# Patient Record
Sex: MALE | URBAN - METROPOLITAN AREA
[De-identification: names, ages, dates, MRNs, and addresses within clinical notes are randomized per-mention and may not be internally consistent; named-entity substitution may affect disease eponyms.]

---

## 2018-07-18 ENCOUNTER — PROCEDURE VISIT (OUTPATIENT)
Dept: PHYSICAL MEDICINE AND REHAB | Age: 43
End: 2018-07-18

## 2018-07-18 VITALS
HEART RATE: 79 BPM | WEIGHT: 202 LBS | DIASTOLIC BLOOD PRESSURE: 72 MMHG | SYSTOLIC BLOOD PRESSURE: 131 MMHG | BODY MASS INDEX: 29.92 KG/M2 | HEIGHT: 69 IN

## 2018-07-18 DIAGNOSIS — Z02.71 DISABILITY EXAMINATION: Primary | ICD-10-CM

## 2018-07-18 PROCEDURE — MISCADRIME: Performed by: PHYSICAL MEDICINE & REHABILITATION

## 2018-07-18 RX ORDER — BUPROPION HYDROCHLORIDE 150 MG/1
150 TABLET ORAL
COMMUNITY

## 2018-07-18 RX ORDER — TIZANIDINE 4 MG/1
4 TABLET ORAL
COMMUNITY

## 2018-07-18 RX ORDER — QUETIAPINE FUMARATE 100 MG/1
100 TABLET, FILM COATED ORAL
COMMUNITY

## 2018-07-18 RX ORDER — PREGABALIN 225 MG/1
225 CAPSULE ORAL
COMMUNITY

## 2018-07-18 RX ORDER — ALPRAZOLAM 0.5 MG/1
0.5 TABLET ORAL
COMMUNITY
Start: 2017-02-03

## 2018-07-18 NOTE — PATIENT INSTRUCTIONS
You were seen today for a one time consultation. A doctor-patient relationship was not established by this examination and  Dr. Chance Phillips does not serve as your primary or consulting doctor. The medical examination addressed body parts that relate to specific injuries or conditions alleged in your case. This examination does not constitute a comprehensive or complete medical examination. In addition, it does not substitute for medical care by your treating physician. Any report resulting from this medical examination will become part of your claim file. In addition, Dr. Chance Phillips may discuss information concerning the results of this examination with the requesting party and may also testify without limitation with regard to all findings of this examination/evaluation in any legal action, judicial or administrative proceedings relating to your claim. Any request for medical records related to this examination should be directed to the party who sent you to Dr. Chance Phillips as they cannot be released directly from our office. Thank you for allowing Dr. Chance Phillips to participate in your care.

## 2018-11-27 ENCOUNTER — PROCEDURE VISIT (OUTPATIENT)
Dept: PHYSICAL MEDICINE AND REHAB | Age: 43
End: 2018-11-27
Payer: COMMERCIAL

## 2018-11-27 DIAGNOSIS — Z02.71 DISABILITY EXAMINATION: Primary | ICD-10-CM

## 2018-11-27 PROCEDURE — MISCADRIME: Performed by: PHYSICAL MEDICINE & REHABILITATION

## 2023-04-20 LAB
N-DESMETHYLTAPENTADOL: >1000 NG/ML
TAPENTADOL: >1000 NG/ML

## 2023-04-24 LAB
6-ACETYLMORPHINE: <25 NG/ML
7-AMINOCLONAZEPAM: <25 NG/ML
ALPHA-HYDROXYALPRAZOLAM: <25 NG/ML
ALPHA-HYDROXYMIDAZOLAM: <25 NG/ML
ALPRAZOLAM: <25 NG/ML
AMPHETAMINE (PRESENCE) IN URINE BY SCREEN METHOD: ABNORMAL
BARBITURATES PRESENCE IN URINE BY SCREEN METHOD: ABNORMAL
CANNABINOIDS IN URINE BY SCREEN METHOD: ABNORMAL
CHLORDIAZEPOXIDE: <25 NG/ML
CLONAZEPAM: <25 NG/ML
COCAINE (PRESENCE) IN URINE BY SCREEN METHOD: ABNORMAL
CODEINE: <50 NG/ML
CREATINE, URINE FOR DRUG: 153 MG/DL
DIAZEPAM: <25 NG/ML
DRUG SCREEN COMMENT URINE: ABNORMAL
EDDP: <25 NG/ML
FENTANYL CONFIRMATION, URINE: <2.5 NG/ML
HYDROCODONE: <25 NG/ML
HYDROMORPHONE: <25 NG/ML
LORAZEPAM: <25 NG/ML
METHADONE CONFIRMATION,URINE: <25 NG/ML
MIDAZOLAM: <25 NG/ML
MORPHINE URINE: <50 NG/ML
NORDIAZEPAM: <25 NG/ML
NORFENTANYL: <2.5 NG/ML
NORHYDROCODONE: <25 NG/ML
NOROXYCODONE: <25 NG/ML
O-DESMETHYLTRAMADOL: <50 NG/ML
OXAZEPAM: <25 NG/ML
OXYCODONE: <25 NG/ML
OXYMORPHONE: <25 NG/ML
PHENCYCLIDINE (PRESENCE) IN URINE BY SCREEN METHOD: ABNORMAL
TEMAZEPAM: <25 NG/ML
TRAMADOL: <50 NG/ML
ZOLPIDEM METABOLITE (ZCA): <25 NG/ML
ZOLPIDEM: <25 NG/ML

## 2023-04-26 LAB
AMPHETAMINES,URINE: <50 NG/ML
MDA,URINE: <200 NG/ML
MDEA,URINE: <200 NG/ML
MDMA,UR: <200 NG/ML
METHAMPHETAMINE QUANTITATIVE URINE: <200 NG/ML
PHENTERMINE,UR: <200 NG/ML

## 2023-08-23 PROBLEM — R29.898 WEAKNESS OF BOTH LOWER EXTREMITIES: Status: ACTIVE | Noted: 2023-08-23

## 2023-08-23 PROBLEM — R26.81 UNSTEADY GAIT: Status: ACTIVE | Noted: 2023-08-23

## 2023-08-23 PROBLEM — R29.898 BILATERAL ARM WEAKNESS: Status: ACTIVE | Noted: 2023-08-23

## 2023-08-23 PROBLEM — G56.03 CARPAL TUNNEL SYNDROME ON BOTH SIDES: Status: ACTIVE | Noted: 2023-08-23

## 2023-08-23 PROBLEM — G43.019 INTRACTABLE MIGRAINE WITHOUT AURA AND WITHOUT STATUS MIGRAINOSUS: Status: ACTIVE | Noted: 2023-08-23

## 2023-08-23 PROBLEM — M79.18 CERVICAL MYOFASCIAL PAIN SYNDROME: Status: ACTIVE | Noted: 2023-08-23

## 2023-08-23 PROBLEM — Z79.891 LONG TERM (CURRENT) USE OF OPIATE ANALGESIC: Status: ACTIVE | Noted: 2023-08-23

## 2023-08-23 PROBLEM — M75.81 RIGHT ROTATOR CUFF TENDINITIS: Status: ACTIVE | Noted: 2023-08-23

## 2023-08-23 PROBLEM — M79.2 NEUROGENIC PAIN: Status: ACTIVE | Noted: 2023-08-23

## 2023-08-23 PROBLEM — M54.12 CERVICAL RADICULOPATHY: Status: ACTIVE | Noted: 2023-08-23

## 2023-08-23 PROBLEM — N52.8 OTHER MALE ERECTILE DYSFUNCTION: Status: ACTIVE | Noted: 2023-08-23

## 2023-08-23 PROBLEM — M96.1 CERVICAL POST-LAMINECTOMY SYNDROME: Status: ACTIVE | Noted: 2023-08-23

## 2023-08-23 RX ORDER — BUPROPION HYDROCHLORIDE 300 MG/1
1 TABLET ORAL DAILY
COMMUNITY
Start: 2018-08-08

## 2023-08-23 RX ORDER — QUETIAPINE FUMARATE 100 MG/1
1 TABLET, FILM COATED ORAL DAILY
COMMUNITY
Start: 2018-01-17

## 2023-08-23 RX ORDER — FLUOXETINE HYDROCHLORIDE 60 MG/1
1 TABLET, FILM COATED ORAL; ORAL DAILY
COMMUNITY

## 2023-08-23 RX ORDER — PREGABALIN 225 MG/1
1 CAPSULE ORAL 3 TIMES DAILY
COMMUNITY
Start: 2018-08-21 | End: 2023-11-28 | Stop reason: ALTCHOICE

## 2023-08-23 RX ORDER — HYDROXYZINE HYDROCHLORIDE 10 MG/1
1 TABLET, FILM COATED ORAL 3 TIMES DAILY
COMMUNITY

## 2023-08-23 RX ORDER — TOPIRAMATE 25 MG/1
2 TABLET ORAL 2 TIMES DAILY
COMMUNITY
Start: 2019-05-09

## 2023-08-23 RX ORDER — NALOXONE HYDROCHLORIDE 4 MG/.1ML
SPRAY NASAL
COMMUNITY
Start: 2020-02-04

## 2023-08-23 RX ORDER — BUPROPION HYDROCHLORIDE 150 MG/1
150 TABLET ORAL
COMMUNITY

## 2023-08-23 RX ORDER — ALPRAZOLAM 0.5 MG/1
TABLET ORAL
COMMUNITY
Start: 2018-09-28 | End: 2023-10-08 | Stop reason: ALTCHOICE

## 2023-10-03 ENCOUNTER — LAB (OUTPATIENT)
Dept: LAB | Facility: LAB | Age: 48
End: 2023-10-03
Payer: COMMERCIAL

## 2023-10-03 ENCOUNTER — OFFICE VISIT (OUTPATIENT)
Dept: PAIN MEDICINE | Facility: CLINIC | Age: 48
End: 2023-10-03
Payer: COMMERCIAL

## 2023-10-03 DIAGNOSIS — Z79.891 LONG TERM (CURRENT) USE OF OPIATE ANALGESIC: ICD-10-CM

## 2023-10-03 DIAGNOSIS — Z79.891 ENCOUNTER FOR LONG-TERM OPIATE ANALGESIC USE: ICD-10-CM

## 2023-10-03 DIAGNOSIS — M79.18 CERVICAL MYOFASCIAL PAIN SYNDROME: ICD-10-CM

## 2023-10-03 DIAGNOSIS — M54.12 CERVICAL RADICULOPATHY: ICD-10-CM

## 2023-10-03 DIAGNOSIS — Z79.891 ENCOUNTER FOR LONG-TERM OPIATE ANALGESIC USE: Primary | ICD-10-CM

## 2023-10-03 DIAGNOSIS — M96.1 CERVICAL POST-LAMINECTOMY SYNDROME: ICD-10-CM

## 2023-10-03 LAB
AMPHETAMINES UR QL SCN: ABNORMAL
BARBITURATES UR QL SCN: ABNORMAL
BZE UR QL SCN: ABNORMAL
CANNABINOIDS UR QL SCN: ABNORMAL
CREAT UR-MCNC: 118.7 MG/DL (ref 20–370)
PCP UR QL SCN: ABNORMAL

## 2023-10-03 PROCEDURE — 99214 OFFICE O/P EST MOD 30 MIN: CPT | Performed by: PHYSICAL MEDICINE & REHABILITATION

## 2023-10-05 ENCOUNTER — PHARMACY VISIT (OUTPATIENT)
Dept: PHARMACY | Facility: CLINIC | Age: 48
End: 2023-10-05
Payer: COMMERCIAL

## 2023-10-05 PROCEDURE — RXMED WILLOW AMBULATORY MEDICATION CHARGE

## 2023-10-08 NOTE — PROGRESS NOTES
Neck pain and right shoulder pain      History of Present Illness  Mr. Abbott returned today for follow up office visit      The pain in the neck is burning and it is associated with tightness over the neck and upper chest area. this is deep and achy and burning with tingling pain.     the pain is in the neck and sternum and in between over the rib cage as the pain is in the upper rib cage area.   the pain starts at the neck and comes around the rib cage to meet at the sternum     of note that he is seeing psychiatry now and he is on xanax 0.5 mg orally three times a day   he realizes the interaction between the therapeutic classes including the respiratory depression and potential death      opioids treatment agreement renewed in February 2023   Oarrs pulled and scanned in the chart. No concerns his OD score is 470 Narx 540 and Sedative 761he is xanax and pregabalin. he is cutting back the xanax by psychiatry      last urine toxicology testing in June 2022 and in April 2023 that was compatible.  This was repeated today as well I will recheck on the results  Xray updated lumbar spine MRI  ORT Score is 1 for age  Pain pathology and pain generators cervical spine and post cervical fusion syndrome  Modalities tried surgeries physical therapy     He remains limited with his activities of daily living function, ambulation and enjoyment of life The pain is interfering with activities of daily living, quality of life and quality of sleep. It is limiting the functions and everything takes longer to complete because of the slowing related to the pain. Movements are cautious to avoid aggravation of the symptoms.      Review of Systems  Denied any fever or chills. No weight loss and no night sweats. No cough or sputum production. No diarrhea   The constipation has been responding to fibers and over the counter medications.      No bladder and bowel incontinence and no other changes in bladder and bowel. No skin changes.  Reports tiredness and fatigability only if the pain is not controlled.   Denied opioids diversion and abuse and denies alcoholism. Denies overuse of the pain medications.     Physical examination  declined Chaperone for the visit and was adequately  draped for the exam.   Awake, alert, oriented for time, place and persons.  The speech is coherent well articulated and understood.   breathing with normal rate and rhythm      positive cervical root tension sign decreased sensory over the radial aspect of the right upper limb   some weakness in the right elbow flexion  no long tracts signs in the lower limbs no increased reflexes. plantar cutaneous reflex are downgoing       no hyperreflexia and plantar cutaneous reflex are downgoing      no pain behavior negative constance testing test    Impression plan  Problem List Items Addressed This Visit       Cervical post-laminectomy syndrome    Relevant Medications    tapentadol (Nucynta) 75 mg tablet    tapentadol (Nucynta) 75 mg tablet (Start on 11/2/2023)    Cervical myofascial pain syndrome    Relevant Medications    tapentadol (Nucynta) 75 mg tablet    tapentadol (Nucynta) 75 mg tablet (Start on 11/2/2023)    Cervical radiculopathy    Relevant Medications    tapentadol (Nucynta) 75 mg tablet    tapentadol (Nucynta) 75 mg tablet (Start on 11/2/2023)    Long term (current) use of opiate analgesic     The pain medications are controlling the symptoms and allowing the improvement of the activities of daily living and quality of life and quality of sleep   Other modalities were tried and failed. We are using the pain medications as a 4th line of treatment after Physical Therapy surgery injection and non opioids pain medications .   Compliance with the treatment plan is monitored closely using random urine drug testing 2-3 times a year and checking on the prescription monitoring drug program on a regular basis at every office visit before any prescription.  We also do random  pill counts upon as indicated upon any suspicion regarding overuse or misuse of pain medications.  The pain control is enabling performance of the home exercises program and that is also helping with the pain control            Other Visit Diagnoses       Encounter for long-term opiate analgesic use    -  Primary    Relevant Medications    tapentadol (Nucynta) 75 mg tablet    tapentadol (Nucynta) 75 mg tablet (Start on 11/2/2023)    Other Relevant Orders    Opiate/Opioid/Benzo Prescription Compliance (Completed)            He does not want to consider spinal cord stimulator or any other interventions at this time.    Based on the above findings and the clinical response to the opioids medications and improvement of the activities of daily living, sleep, and work performance. We made this complex decision to continue the opioids therapy in light of the evidence of the patient's responsibility in using the pain medications as prescribed for the nonmalignant chronic pain condition. We discussed about the use of the pain medications to treat the symptoms of chronic nonmalignant pain and we are not trying the repair the permanent damage in the tissues, rather we are trying to control the symptoms induced by the permanent damage to the tissues inducing the chronic pain condition and resulting disability. I explained the difference and discussed it with the patient and stressed the importance of knowing the difference especially because of the potential side effects and the potential addicting effect and habit forming nature of the dangerous drugs we are using to treat the symptoms of the chronic pain.     We discussed that we are prescribing the medications on good stuart and legitimate medical reason     The level of clinical decision making in this office visit,  is high, given the high risks of complications with the morbidity and mortality due to the fact that acute and chronic pain may pose a threat to life and bodily  function, if under treated, poorly treated, or with failure to maintain adequate treatment and timely medical follow up. Additionally over treatment has its own set of complications including overdosing on the pain medications and also the habit forming potentials with the use of the medications used to treat chronic painful conditions including therapeutic classes classified as dangerous medications. Given the serious and fluctuating nature of pain level and instensity with extensive consideration for whenever pain changes, there is always the risk of prolonged functional impairment requiring close patient monitoring with regular assessments and reassessments and high level medical decision making at every office visit. The amount and complexity of data reviewed is high given the patient clinical presentation, labs,  data, radiology reports, and other tests as discussed during office visits. Pertinent data whether positive or negative were taken in consideration in the process of making this high level medical decision.

## 2023-10-30 DIAGNOSIS — M79.18 CERVICAL MYOFASCIAL PAIN SYNDROME: ICD-10-CM

## 2023-10-30 DIAGNOSIS — Z79.891 ENCOUNTER FOR LONG-TERM OPIATE ANALGESIC USE: ICD-10-CM

## 2023-10-30 DIAGNOSIS — M54.12 CERVICAL RADICULOPATHY: ICD-10-CM

## 2023-10-30 DIAGNOSIS — M96.1 CERVICAL POST-LAMINECTOMY SYNDROME: ICD-10-CM

## 2023-10-30 RX ORDER — PREGABALIN 225 MG/1
225 CAPSULE ORAL 3 TIMES DAILY
Qty: 270 CAPSULE | Refills: 0 | Status: CANCELLED | OUTPATIENT
Start: 2023-10-30 | End: 2024-05-01

## 2023-11-04 ENCOUNTER — PHARMACY VISIT (OUTPATIENT)
Dept: PHARMACY | Facility: CLINIC | Age: 48
End: 2023-11-04
Payer: COMMERCIAL

## 2023-11-04 PROCEDURE — RXMED WILLOW AMBULATORY MEDICATION CHARGE

## 2023-11-28 ENCOUNTER — OFFICE VISIT (OUTPATIENT)
Dept: PAIN MEDICINE | Facility: CLINIC | Age: 48
End: 2023-11-28
Payer: COMMERCIAL

## 2023-11-28 ENCOUNTER — PHARMACY VISIT (OUTPATIENT)
Dept: PHARMACY | Facility: CLINIC | Age: 48
End: 2023-11-28
Payer: COMMERCIAL

## 2023-11-28 DIAGNOSIS — Z79.891 LONG TERM (CURRENT) USE OF OPIATE ANALGESIC: ICD-10-CM

## 2023-11-28 DIAGNOSIS — M79.18 CERVICAL MYOFASCIAL PAIN SYNDROME: ICD-10-CM

## 2023-11-28 DIAGNOSIS — Z79.891 ENCOUNTER FOR LONG-TERM OPIATE ANALGESIC USE: ICD-10-CM

## 2023-11-28 DIAGNOSIS — Z79.891 LONG TERM CURRENT USE OF OPIATE ANALGESIC: ICD-10-CM

## 2023-11-28 DIAGNOSIS — M96.1 CERVICAL POST-LAMINECTOMY SYNDROME: Primary | ICD-10-CM

## 2023-11-28 DIAGNOSIS — M54.12 CERVICAL RADICULOPATHY: ICD-10-CM

## 2023-11-28 DIAGNOSIS — M75.81 RIGHT ROTATOR CUFF TENDINITIS: ICD-10-CM

## 2023-11-28 PROCEDURE — 99214 OFFICE O/P EST MOD 30 MIN: CPT | Performed by: PHYSICAL MEDICINE & REHABILITATION

## 2023-11-28 PROCEDURE — RXMED WILLOW AMBULATORY MEDICATION CHARGE

## 2023-11-28 RX ORDER — PREGABALIN 150 MG/1
150 CAPSULE ORAL 2 TIMES DAILY
Qty: 56 CAPSULE | Refills: 1 | Status: SHIPPED | OUTPATIENT
Start: 2023-11-28 | End: 2024-01-25 | Stop reason: SDUPTHER

## 2023-11-28 NOTE — PROGRESS NOTES
Chief complaint  Pain in the neck and right arm    History  Mr Abbott is back for visit. He cotinue with pain in the back and right arm. The pain at the base of the neck is associated with deep stabbing sensation along with tight muscles limiting the range of motion of the neck mainly in flexion and sidebending.  This is associated with burning sensation going down the inner aspect of the right upper limb reaching the elbow, around the medial epicondyle, and ulnar aspect of the right forearm to the  wrist and the ring and little fingers. The pain worsens with reaching overhead and with bending the neck forward and to the side.  Rotation of the neck is limited by the tight muscles at the base of the neck and also by increased pain to the right upper limb.  This is associated with weakness with the right wrist flexion and decreased manual dexterity. The  has been weaker since the appearance of this pain.  Dropping objects from the hand if not careful handling objects.  No reported difficulty with the gait and no trouble with bowel or bladder continence.          Pain level without medication is 7/10 , with the medication pain level 1/10.     The pain meds are helping control the pain and improving Activities of Daily living and quality of life and quality of sleep.    opioids treatment agreement 2023  Oarrs pulled and scanned in the chart  no concerns  last urine toxicology testing earlier this year and it was compliant we will repeat  Xray updated MRI Cspine   ORT Score is  0  Pain pathology and pain generators cspine   Modalities tried injection, surgery, physical therapy, TENS unit, nonsteroidal anti-inflammatory medication  cervical fusion     Denied any fever or chills. No weight loss and no night sweats. No cough or sputum production. No diarrhea   The constipation has been responding to fibers and over the counter medications.     No bladder and bowel incontinence and no other changes in bladder and  bowel. No skin changes.  Reports tiredness and fatigability only if the pain is not controlled.   Denied opioids diversion and abuse and denies alcoholism. Denies overuse of the pain medications.    The control of the pain with the pain medications is helping the control of the symptoms and allowing the function and activities of daily living, enjoyment of life, improving the quality of life and sleep with less interruption by the pain. The goal is symptomatic control of the nonmalignant chronic pain and not to repair the permanent damage in the tissues inducing the chronic pain conditions. We are aiming to shift the focus from the nonmalignant chronic pain to other aspects of life by symptomatically treating this chronic pain. If this pain is not treated it will lead to major morbidity and it is also associated with increased risks of mortality. The patient understands those very clearly and also understand high risks of morbidity and mortality if not strictly adherent to the treatment recommendations and reporting any associated side effects. Also patient understand the full responsibility associated with these medications to avoid abuse or overuse or any use of these medications for anything besides treating the patient's own chronic pain and nothing else under any circumstances.        Physical examination  Awake, alert and oriented for time place and persons   declined Chaperone for the visit and was adequately  draped for the exam.      Healed cervical incision Examination of the cervical spine showed tight muscle bands on the right side limiting the range of motion of the cervical spine in flexion and bending to the right side because that increases the pain.  Spurling's maneuver increased the pain at the base of the neck and down the right upper limb on the posterior aspect of the right arm reaching the elbow, forearm, the wrist to themiddle finger. Similar findings with cervical root tension sign on the  right side with the pain reaching down to the middle finger.   Decreased sensory to light touch on the posterior aspect of the right forearm to the middle finger.  Deep tendon reflexes showed normal biceps and brachioradialis reflex.  The  triceps reflexes is  decreased.  Elbow and wrist extension are 4/5 on the right compared to 5/5 on the left.  The right  is slightly weaker compared to the left .    Tinel's sign over the median nerve at the wrist and ulnar nerve at the elbow are both negative.    No increased tendon reflexes in the lower limbs plantar cutaneous are downgoing bilaterally.     The right shoulder physical examination showed mild atrophy of the right supraspinatus compared to the left side.  Impingement at 90 degrees in forward flexion and abduction.  This improved by few degrees upon external rotation.  Tight muscle bands and trigger points around the shoulder blade muscles.  No overt shoulder instability but tenderness on palpation of the subacromial area.  Negative cervical root tension sign.  Right shoulder strength is 4/5 in all directions.  No distal sensorimotor deficits associated with the right shoulder.       Reviewed MRI of cspine  from April 2018 showing no changes   Diagnosis  Problem List Items Addressed This Visit    None       Plan  Reviewed the pain generators.  Went over the types of pain with neuropathic and nociceptive and different pathologies and therapeutic modalities. Discussed the mechanism of action of interventions from acupuncture, physical therapy , regular exercises, injections, botox, spinal cord stimulation, and role of surgery     Went over pathology of the intervertebral disc displacement and the anatomical relation to the Nerve roots and relation to the radicular symptoms. Went over treatment modalities with conservative treatment including acupuncture   and epidural steroid injection with fluoroscopy guidance and last resort of surgery    Based on the above  findings and the clinical response to the opioids medications and improvement of the activities of daily living, sleep, and work performance. We made this complex decision to continue the opioids therapy in light of the evidence of the patient's responsibility in using the pain medications as prescribed for the nonmalignant chronic pain condition. We discussed about the use of the pain medications to treat the symptoms of chronic nonmalignant pain and we are not trying the repair the permanent damage in the tissues, rather we are trying to control the symptoms induced by the permanent damage to the tissues inducing the chronic pain condition and resulting disability. I explained the difference and discussed it with the patient and stressed the importance of knowing the difference especially because of the potential side effects and the potential addicting effect and habit forming nature of the dangerous drugs we are using to treat the symptoms of the chronic pain.      We discussed that we are prescribing the medications on good stuart and legitimate medical reason.     We reviewed the side effects and precautions of opioids prescriptions as discussed in the opioids treatment agreement.    realizes the interaction between the therapeutic classes including the respiratory depression and potential death     Random drug testing twice in 6 months we will submit     Nucynta 75  mg max of 4 a day and lyrica to help with burning pain    Discussed about NSAIDS and I explained about the opioids sparing effect to allow keeping the opioids dose at minimal effective dose.   I went over the potential side effects of the NSAIDS on the gastrointestinal, renal and cardiovascular systems.      I detailed the side effects from the acetaminophen in the medication and made aware of those. I also explained about the cumulative effects on the organs and mainly the liver.     Given the opioids therapy , we discussed about the risk for  accidental over dose on the pain medications, either for patient or other household. I went over the mechanism of action and mode of use of the Naloxone according to the  recommendations. I will provide a prescription for a kit.     Follow-up 8 weeks or earlier if needed     The level of clinical decision making in this office visit,  is high, given the high risks of complications with the morbidity and mortality due to the fact that acute and chronic pain may pose a threat to life and bodily function, if under treated, poorly treated, or with failure to maintain adequate treatment and timely medical follow up. Additionally over treatment has its own set of complications including overdosing on the pain medications and also the habit forming potentials with the use of the medications used to treat chronic painful conditions including therapeutic classes classified as dangerous medications. Given the serious and fluctuating nature of pain level and instensity with extensive consideration for whenever pain changes, there is always the risk of prolonged functional impairment requiring close patient monitoring with regular assessments and reassessments and high level medical decision making at every office visit. The amount and complexity of data reviewed is high given the patient clinical presentation, labs,  data, radiology reports, and other tests as discussed during office visits. Pertinent data whether positive or negative were taken in consideration in the process of making this high level medical decision.

## 2023-12-02 ENCOUNTER — PHARMACY VISIT (OUTPATIENT)
Dept: PHARMACY | Facility: CLINIC | Age: 48
End: 2023-12-02
Payer: COMMERCIAL

## 2023-12-02 PROCEDURE — RXMED WILLOW AMBULATORY MEDICATION CHARGE

## 2023-12-30 ENCOUNTER — PHARMACY VISIT (OUTPATIENT)
Dept: PHARMACY | Facility: CLINIC | Age: 48
End: 2023-12-30
Payer: COMMERCIAL

## 2023-12-30 PROCEDURE — RXMED WILLOW AMBULATORY MEDICATION CHARGE

## 2024-01-23 ENCOUNTER — APPOINTMENT (OUTPATIENT)
Dept: PAIN MEDICINE | Facility: CLINIC | Age: 49
End: 2024-01-23
Payer: COMMERCIAL

## 2024-01-25 ENCOUNTER — LAB (OUTPATIENT)
Dept: LAB | Facility: LAB | Age: 49
End: 2024-01-25
Payer: COMMERCIAL

## 2024-01-25 ENCOUNTER — OFFICE VISIT (OUTPATIENT)
Dept: PAIN MEDICINE | Facility: CLINIC | Age: 49
End: 2024-01-25
Payer: COMMERCIAL

## 2024-01-25 DIAGNOSIS — Z79.891 LONG TERM (CURRENT) USE OF OPIATE ANALGESIC: ICD-10-CM

## 2024-01-25 DIAGNOSIS — M79.18 CERVICAL MYOFASCIAL PAIN SYNDROME: ICD-10-CM

## 2024-01-25 DIAGNOSIS — M54.12 CERVICAL RADICULOPATHY: ICD-10-CM

## 2024-01-25 DIAGNOSIS — Z79.891 LONG TERM CURRENT USE OF OPIATE ANALGESIC: Primary | ICD-10-CM

## 2024-01-25 DIAGNOSIS — Z79.891 LONG TERM CURRENT USE OF OPIATE ANALGESIC: ICD-10-CM

## 2024-01-25 DIAGNOSIS — M75.81 RIGHT ROTATOR CUFF TENDINITIS: ICD-10-CM

## 2024-01-25 DIAGNOSIS — M96.1 CERVICAL POST-LAMINECTOMY SYNDROME: ICD-10-CM

## 2024-01-25 DIAGNOSIS — Z79.891 ENCOUNTER FOR LONG-TERM OPIATE ANALGESIC USE: ICD-10-CM

## 2024-01-25 LAB
AMPHETAMINES UR QL SCN: ABNORMAL
BARBITURATES UR QL SCN: ABNORMAL
BZE UR QL SCN: ABNORMAL
CANNABINOIDS UR QL SCN: ABNORMAL
CREAT UR-MCNC: 120.7 MG/DL (ref 20–370)
PCP UR QL SCN: ABNORMAL

## 2024-01-25 PROCEDURE — 82570 ASSAY OF URINE CREATININE: CPT

## 2024-01-25 PROCEDURE — 80307 DRUG TEST PRSMV CHEM ANLYZR: CPT

## 2024-01-25 PROCEDURE — 80365 DRUG SCREENING OXYCODONE: CPT

## 2024-01-25 PROCEDURE — 80346 BENZODIAZEPINES1-12: CPT

## 2024-01-25 PROCEDURE — 80361 OPIATES 1 OR MORE: CPT

## 2024-01-25 PROCEDURE — 80368 SEDATIVE HYPNOTICS: CPT

## 2024-01-25 PROCEDURE — 80373 DRUG SCREENING TRAMADOL: CPT

## 2024-01-25 PROCEDURE — 80354 DRUG SCREENING FENTANYL: CPT

## 2024-01-25 PROCEDURE — 80358 DRUG SCREENING METHADONE: CPT

## 2024-01-25 PROCEDURE — RXMED WILLOW AMBULATORY MEDICATION CHARGE

## 2024-01-25 PROCEDURE — 99214 OFFICE O/P EST MOD 30 MIN: CPT | Performed by: PHYSICAL MEDICINE & REHABILITATION

## 2024-01-25 PROCEDURE — 80324 DRUG SCREEN AMPHETAMINES 1/2: CPT

## 2024-01-25 RX ORDER — PREGABALIN 150 MG/1
150 CAPSULE ORAL 2 TIMES DAILY
Qty: 56 CAPSULE | Refills: 1 | Status: SHIPPED | OUTPATIENT
Start: 2024-01-25 | End: 2024-03-19 | Stop reason: SDUPTHER

## 2024-01-25 NOTE — PROGRESS NOTES
Chief complaint  Neck and RUL pain   R shoulder pain     History  Mr Abbott is back for visit  Continue to have the pain . Continue with pain in the neck and upper limbs wrose on the R  The pain at the base of the neck is associated with deep stabbing sensation along with tight muscles limiting the range of motion of the neck mainly in flexion and sidebending.  This is associated with burning sensation going down the back of the right upper limb reaching the elbow and the back of the right forearm to the  wrist and middle finger. The pain worsens with reaching overhead and with bending the neck forward and to the side.  Rotation of the neck is limited by the tight muscles at the base of the neck and also by increased pain to the right upper limb.  This is associated with weakness with the right  elbow extension and with wrist extenstion and affecting the  in that the  has been weaker with the appearance of this pain.  There is decreased manual dexterity of the right hand.  Occasionally dropping objects from the hand if not careful handling objects.  No reported difficulty with the gait and no trouble with bowel or bladder continence.     He has pain and tight muscles around shldr blade on R worse with movement  (that is from the Cspin not from the shldr pathology itself      Pain level without medication is 8/10 , with the medication pain level 4/10.     The pain meds are helping control the pain and improving Activities of Daily living and quality of life and quality of sleep.    opioids treatment agreement Jan 2024  PDI (Pain Disability Index) score: 34 the cold and damp weather are affecting him  Oarrs pulled and scanned in the chart  no concerns  last urine toxicology testing earlier this year and it was compliant we will repeat  Xray updated spine   ORT Score is  1 for depression   Pain pathology and pain generators spine   Modalities tried injection, surgery, physical therapy, TENS unit,  nonsteroidal anti-inflammatory medication  doesnot want SCS      Denied any fever or chills. No weight loss and no night sweats. No cough or sputum production. No diarrhea   The constipation has been responding to fibers and over the counter medications.     No bladder and bowel incontinence and no other changes in bladder and bowel. No skin changes.  Reports tiredness and fatigability only if the pain is not controlled.   Denied opioids diversion and abuse and denies alcoholism. Denies overuse of the pain medications.    The control of the pain with the pain medications is helping the control of the symptoms and allowing the function and activities of daily living, enjoyment of life, improving the quality of life and sleep with less interruption by the pain. The goal is symptomatic control of the nonmalignant chronic pain and not to repair the permanent damage in the tissues inducing the chronic pain conditions. We are aiming to shift the focus from the nonmalignant chronic pain to other aspects of life by symptomatically treating this chronic pain. If this pain is not treated it will lead to major morbidity and it is also associated with increased risks of mortality. The patient understands those very clearly and also understand high risks of morbidity and mortality if not strictly adherent to the treatment recommendations and reporting any associated side effects. Also patient understand the full responsibility associated with these medications to avoid abuse or overuse or any use of these medications for anything besides treating the patient's own chronic pain and nothing else under any circumstances.        Physical examination  Awake, alert and oriented for time place and persons   declined Chaperone for the visit and was adequately  draped for the exam.    Tirgger point over the R shoulder blade  Tight band around neck R>>L  CRT positive on R  No long tract  plantar cutaneous reflex are down going  bilaterally  No bowel and bladder incontinence     Diagnosis  Problem List Items Addressed This Visit       Cervical post-laminectomy syndrome    Relevant Medications    tapentadol (Nucynta) 75 mg tablet (Start on 1/27/2024)    tapentadol (Nucynta) 75 mg tablet (Start on 2/24/2024)    pregabalin (Lyrica) 150 mg capsule    Cervical myofascial pain syndrome    Relevant Medications    tapentadol (Nucynta) 75 mg tablet (Start on 1/27/2024)    tapentadol (Nucynta) 75 mg tablet (Start on 2/24/2024)    pregabalin (Lyrica) 150 mg capsule    Cervical radiculopathy    Relevant Medications    tapentadol (Nucynta) 75 mg tablet (Start on 1/27/2024)    tapentadol (Nucynta) 75 mg tablet (Start on 2/24/2024)    pregabalin (Lyrica) 150 mg capsule    Right rotator cuff tendinitis    Relevant Medications    tapentadol (Nucynta) 75 mg tablet (Start on 1/27/2024)    tapentadol (Nucynta) 75 mg tablet (Start on 2/24/2024)    pregabalin (Lyrica) 150 mg capsule    Long term (current) use of opiate analgesic    Relevant Medications    tapentadol (Nucynta) 75 mg tablet (Start on 1/27/2024)    tapentadol (Nucynta) 75 mg tablet (Start on 2/24/2024)    pregabalin (Lyrica) 150 mg capsule     Other Visit Diagnoses       Long term current use of opiate analgesic    -  Primary    Relevant Medications    tapentadol (Nucynta) 75 mg tablet (Start on 1/27/2024)    tapentadol (Nucynta) 75 mg tablet (Start on 2/24/2024)    pregabalin (Lyrica) 150 mg capsule    Other Relevant Orders    Opiate/Opioid/Benzo Prescription Compliance    Encounter for long-term opiate analgesic use        Relevant Medications    tapentadol (Nucynta) 75 mg tablet (Start on 1/27/2024)    tapentadol (Nucynta) 75 mg tablet (Start on 2/24/2024)    pregabalin (Lyrica) 150 mg capsule             Plan  Reviewed the pain generators.  Went over the types of pain with neuropathic and nociceptive and different pathologies and therapeutic modalities. Discussed the mechanism of action of  interventions from acupuncture, physical therapy , regular exercises, injections, botox, spinal cord stimulation, and role of surgery     Went over pathology of the intervertebral disc displacement and the anatomical relation to the Nerve roots and relation to the radicular symptoms. Went over treatment modalities with conservative treatment including acupuncture   and epidural steroid injection with fluoroscopy guidance and last resort of surgery    Based on the above findings and the clinical response to the opioids medications and improvement of the activities of daily living, sleep, and work performance. We made this complex decision to continue the opioids therapy in light of the evidence of the patient's responsibility in using the pain medications as prescribed for the nonmalignant chronic pain condition. We discussed about the use of the pain medications to treat the symptoms of chronic nonmalignant pain and we are not trying the repair the permanent damage in the tissues, rather we are trying to control the symptoms induced by the permanent damage to the tissues inducing the chronic pain condition and resulting disability. I explained the difference and discussed it with the patient and stressed the importance of knowing the difference especially because of the potential side effects and the potential addicting effect and habit forming nature of the dangerous drugs we are using to treat the symptoms of the chronic pain.      We discussed that we are prescribing the medications on good stuart and legitimate medical reason.     We reviewed the side effects and precautions of opioids prescriptions as discussed in the opioids treatment agreement.    realizes the interaction between the therapeutic classes including the respiratory depression and potential death     Random drug testing twice in 6 months we will submit     Nucynta 75 mg po qid and lyrica 150 mg bid  has a narcan at home know how and when to use it  if needed.   Does not want HIEN neither SCS    Discussed about NSAIDS and I explained about the opioids sparing effect to allow keeping the opioids dose at minimal effective dose.   I went over the potential side effects of the NSAIDS on the gastrointestinal, renal and cardiovascular systems.      I detailed the side effects from the acetaminophen in the medication and made aware of those. I also explained about the cumulative effects on the organs and mainly the liver.     Given the opioids therapy , we discussed about the risk for accidental over dose on the pain medications, either for patient or other household. I went over the mechanism of action and mode of use of the Naloxone according to the  recommendations. I will provide a prescription for a kit.     Follow-up 8 weeks or earlier if needed     The level of clinical decision making in this office visit,  is high, given the high risks of complications with the morbidity and mortality due to the fact that acute and chronic pain may pose a threat to life and bodily function, if under treated, poorly treated, or with failure to maintain adequate treatment and timely medical follow up. Additionally over treatment has its own set of complications including overdosing on the pain medications and also the habit forming potentials with the use of the medications used to treat chronic painful conditions including therapeutic classes classified as dangerous medications. Given the serious and fluctuating nature of pain level and instensity with extensive consideration for whenever pain changes, there is always the risk of prolonged functional impairment requiring close patient monitoring with regular assessments and reassessments and high level medical decision making at every office visit. The amount and complexity of data reviewed is high given the patient clinical presentation, labs,  data, radiology reports, and other tests as discussed during office  visits. Pertinent data whether positive or negative were taken in consideration in the process of making this high level medical decision.

## 2024-01-27 ENCOUNTER — PHARMACY VISIT (OUTPATIENT)
Dept: PHARMACY | Facility: CLINIC | Age: 49
End: 2024-01-27
Payer: COMMERCIAL

## 2024-01-27 PROCEDURE — RXMED WILLOW AMBULATORY MEDICATION CHARGE

## 2024-01-29 LAB
AMPHET UR-MCNC: <50 NG/ML
MDA UR-MCNC: <200 NG/ML
MDEA UR-MCNC: <200 NG/ML
MDMA UR-MCNC: <200 NG/ML
METHAMPHET UR-MCNC: <200 NG/ML
PHENTERMINE UR CFM-MCNC: <200 NG/ML

## 2024-02-24 ENCOUNTER — PHARMACY VISIT (OUTPATIENT)
Dept: PHARMACY | Facility: CLINIC | Age: 49
End: 2024-02-24
Payer: COMMERCIAL

## 2024-02-24 PROCEDURE — RXMED WILLOW AMBULATORY MEDICATION CHARGE

## 2024-03-14 ENCOUNTER — APPOINTMENT (OUTPATIENT)
Dept: PAIN MEDICINE | Facility: CLINIC | Age: 49
End: 2024-03-14
Payer: COMMERCIAL

## 2024-03-19 ENCOUNTER — OFFICE VISIT (OUTPATIENT)
Dept: PAIN MEDICINE | Facility: CLINIC | Age: 49
End: 2024-03-19
Payer: COMMERCIAL

## 2024-03-19 DIAGNOSIS — M54.12 CERVICAL RADICULOPATHY: ICD-10-CM

## 2024-03-19 DIAGNOSIS — M96.1 CERVICAL POST-LAMINECTOMY SYNDROME: Primary | ICD-10-CM

## 2024-03-19 DIAGNOSIS — Z79.891 LONG TERM CURRENT USE OF OPIATE ANALGESIC: ICD-10-CM

## 2024-03-19 DIAGNOSIS — M75.81 RIGHT ROTATOR CUFF TENDINITIS: ICD-10-CM

## 2024-03-19 DIAGNOSIS — Z79.891 LONG TERM (CURRENT) USE OF OPIATE ANALGESIC: ICD-10-CM

## 2024-03-19 DIAGNOSIS — M79.18 CERVICAL MYOFASCIAL PAIN SYNDROME: ICD-10-CM

## 2024-03-19 DIAGNOSIS — Z79.891 ENCOUNTER FOR LONG-TERM OPIATE ANALGESIC USE: ICD-10-CM

## 2024-03-19 PROCEDURE — 99214 OFFICE O/P EST MOD 30 MIN: CPT | Performed by: PHYSICAL MEDICINE & REHABILITATION

## 2024-03-19 RX ORDER — PREGABALIN 150 MG/1
150 CAPSULE ORAL 2 TIMES DAILY
Qty: 56 CAPSULE | Refills: 1 | Status: SHIPPED | OUTPATIENT
Start: 2024-03-22 | End: 2024-05-14 | Stop reason: SDUPTHER

## 2024-03-19 NOTE — PROGRESS NOTES
Chief complaint  Neck and shoulder pain     History  Jared Abbott is back for pain management office visit  Continue with neck and upper limb pain . That is related to neck and post surgery  The pain at the base of the neck is associated with deep stabbing sensation along with tight muscles limiting the range of motion of the neck mainly in flexion and sidebending.  This is associated with burning sensation going down the radial aspect of the right upper limb reaching above the elbow. No reported consistent symptoms beyond the elbow to the forearm, wrists or fingers. The pain worsens with reaching overhead or with bending with the neck forward and to the side.  Rotation of the neck is limited by the tight muscles at the base of the neck and also by increased pain to the right upper limb.  This is associated with weakness with the right shoulder and elbow flexion and external rotation.  No clear weakness in the  and the manual dexterity of the right hand is not affected.  No reported dropping objects from the hand.  No reported difficulty with the gait and no trouble with bowel or bladder continence.       Pain level without medication is 8/10 , with the medication pain level 3/10.     The pain meds are helping control the pain and improving Activities of Daily living and quality of life and quality of sleep.    opioids treatment agreement jan 2024  PDI (Pain Disability Index) score: 42  Oarrs pulled and scanned in the chart  no concerns  last urine toxicology testing earlier this year and it was compliant we will repeat  Xray updated spine neck   ORT Score is  0  Pain pathology and pain generators spine   Modalities tried injection, surgery, physical therapy, TENS unit, nonsteroidal anti-inflammatory medication  does not want SCS      Denied any fever or chills. No weight loss and no night sweats. No cough or sputum production. No diarrhea   The constipation has been responding to fibers and over the  counter medications.     No bladder and bowel incontinence and no other changes in bladder and bowel. No skin changes.  Reports tiredness and fatigability only if the pain is not controlled.   Denied opioids diversion and abuse and denies alcoholism. Denies overuse of the pain medications.    The control of the pain with the pain medications is helping the control of the symptoms and allowing the function and activities of daily living, enjoyment of life, improving the quality of life and sleep with less interruption by the pain. The goal is symptomatic control of the nonmalignant chronic pain and not to repair the permanent damage in the tissues inducing the chronic pain conditions. We are aiming to shift the focus from the nonmalignant chronic pain to other aspects of life by symptomatically treating this chronic pain. If this pain is not treated it will lead to major morbidity and it is also associated with increased risks of mortality. The patient understands those very clearly and also understand high risks of morbidity and mortality if not strictly adherent to the treatment recommendations and reporting any associated side effects. Also patient understand the full responsibility associated with these medications to avoid abuse or overuse or any use of these medications for anything besides treating the patient's own chronic pain and nothing else under any circumstances.        Physical examination  Awake, alert and oriented for time place and persons   declined Chaperone for the visit and was adequately  draped for the exam.      Examination of the cervical spine showed tight muscle bands on the right side limiting the range of motion of the cervical spine in flexion and bending to the right side because that increases the pain.  Spurling's maneuver increased the pain at the base of the neck and down the right upper limb on the radial aspect of the arm reaching above the elbow.  Similar finding with cervical  root tension sign on the right side.  No reported pain below the elbow.  Decreased sensory to light touch on the radial aspect of the right arm.  Right shoulder abduction and flexion is 4/5, elbow flexion on the right is slightly weaker compared to the left side.  Deep tendon reflexes showed decreased biceps reflex.  The brachioradialis and the triceps reflexes are present.  No sensorimotor deficits below the elbow.  Good  bilaterally.  Tinel's sign over the median nerve at the wrist and ulnar nerve at the elbow are both negative.    No increased tendon reflexes in the lower limbs plantar cutaneous are downgoing bilaterally.        Diagnosis  Problem List Items Addressed This Visit       Cervical post-laminectomy syndrome - Primary    Relevant Medications    pregabalin (Lyrica) 150 mg capsule (Start on 3/22/2024)    tapentadol (Nucynta) 75 mg tablet (Start on 3/22/2024)    tapentadol (Nucynta) 75 mg tablet (Start on 4/19/2024)    Cervical myofascial pain syndrome    Relevant Medications    pregabalin (Lyrica) 150 mg capsule (Start on 3/22/2024)    tapentadol (Nucynta) 75 mg tablet (Start on 3/22/2024)    tapentadol (Nucynta) 75 mg tablet (Start on 4/19/2024)    Cervical radiculopathy    Relevant Medications    pregabalin (Lyrica) 150 mg capsule (Start on 3/22/2024)    tapentadol (Nucynta) 75 mg tablet (Start on 3/22/2024)    tapentadol (Nucynta) 75 mg tablet (Start on 4/19/2024)    Right rotator cuff tendinitis    Relevant Medications    pregabalin (Lyrica) 150 mg capsule (Start on 3/22/2024)    tapentadol (Nucynta) 75 mg tablet (Start on 3/22/2024)    tapentadol (Nucynta) 75 mg tablet (Start on 4/19/2024)    Long term (current) use of opiate analgesic    Relevant Medications    pregabalin (Lyrica) 150 mg capsule (Start on 3/22/2024)    tapentadol (Nucynta) 75 mg tablet (Start on 3/22/2024)    tapentadol (Nucynta) 75 mg tablet (Start on 4/19/2024)     Other Visit Diagnoses       Long term current use of opiate  analgesic        Relevant Medications    pregabalin (Lyrica) 150 mg capsule (Start on 3/22/2024)    tapentadol (Nucynta) 75 mg tablet (Start on 3/22/2024)    tapentadol (Nucynta) 75 mg tablet (Start on 4/19/2024)    Encounter for long-term opiate analgesic use        Relevant Medications    pregabalin (Lyrica) 150 mg capsule (Start on 3/22/2024)    tapentadol (Nucynta) 75 mg tablet (Start on 3/22/2024)    tapentadol (Nucynta) 75 mg tablet (Start on 4/19/2024)             Plan  Reviewed the pain generators.  Went over the types of pain with neuropathic and nociceptive and different pathologies and therapeutic modalities. Discussed the mechanism of action of interventions from acupuncture, physical therapy , regular exercises, injections, botox, spinal cord stimulation, and role of surgery     Went over pathology of the intervertebral disc displacement and the anatomical relation to the Nerve roots and relation to the radicular symptoms. Went over treatment modalities with conservative treatment including acupuncture   and epidural steroid injection with fluoroscopy guidance and last resort of surgery    Based on the above findings and the clinical response to the opioids medications and improvement of the activities of daily living, sleep, and work performance. We made this complex decision to continue the opioids therapy in light of the evidence of the patient's responsibility in using the pain medications as prescribed for the nonmalignant chronic pain condition. We discussed about the use of the pain medications to treat the symptoms of chronic nonmalignant pain and we are not trying the repair the permanent damage in the tissues, rather we are trying to control the symptoms induced by the permanent damage to the tissues inducing the chronic pain condition and resulting disability. I explained the difference and discussed it with the patient and stressed the importance of knowing the difference especially because  of the potential side effects and the potential addicting effect and habit forming nature of the dangerous drugs we are using to treat the symptoms of the chronic pain.      We discussed that we are prescribing the medications on good stuart and legitimate medical reason.     We reviewed the side effects and precautions of opioids prescriptions as discussed in the opioids treatment agreement.    realizes the interaction between the therapeutic classes including the respiratory depression and potential death     Random drug testing twice in 6 months we will submit     Nucynta 75 mg qid   Lyrica 150 mg bid   has a narcan at home know how and when to use it if needed.  Discussed about considering cut back on pain medications     Discussed about NSAIDS and I explained about the opioids sparing effect to allow keeping the opioids dose at minimal effective dose.   I went over the potential side effects of the NSAIDS on the gastrointestinal, renal and cardiovascular systems.      I detailed the side effects from the acetaminophen in the medication and made aware of those. I also explained about the cumulative effects on the organs and mainly the liver.     Given the opioids therapy , we discussed about the risk for accidental over dose on the pain medications, either for patient or other household. I went over the mechanism of action and mode of use of the Naloxone according to the  recommendations. I will provide a prescription for a kit.     Follow-up 8 weeks or earlier if needed     The level of clinical decision making in this office visit,  is high, given the high risks of complications with the morbidity and mortality due to the fact that acute and chronic pain may pose a threat to life and bodily function, if under treated, poorly treated, or with failure to maintain adequate treatment and timely medical follow up. Additionally over treatment has its own set of complications including overdosing on the  pain medications and also the habit forming potentials with the use of the medications used to treat chronic painful conditions including therapeutic classes classified as dangerous medications. Given the serious and fluctuating nature of pain level and instensity with extensive consideration for whenever pain changes, there is always the risk of prolonged functional impairment requiring close patient monitoring with regular assessments and reassessments and high level medical decision making at every office visit. The amount and complexity of data reviewed is high given the patient clinical presentation, labs,  data, radiology reports, and other tests as discussed during office visits. Pertinent data whether positive or negative were taken in consideration in the process of making this high level medical decision.

## 2024-03-22 PROCEDURE — RXMED WILLOW AMBULATORY MEDICATION CHARGE

## 2024-03-23 ENCOUNTER — PHARMACY VISIT (OUTPATIENT)
Dept: PHARMACY | Facility: CLINIC | Age: 49
End: 2024-03-23
Payer: COMMERCIAL

## 2024-03-23 PROCEDURE — RXMED WILLOW AMBULATORY MEDICATION CHARGE

## 2024-03-29 ENCOUNTER — PHARMACY VISIT (OUTPATIENT)
Dept: PHARMACY | Facility: CLINIC | Age: 49
End: 2024-03-29
Payer: COMMERCIAL

## 2024-04-20 ENCOUNTER — PHARMACY VISIT (OUTPATIENT)
Dept: PHARMACY | Facility: CLINIC | Age: 49
End: 2024-04-20
Payer: COMMERCIAL

## 2024-04-20 PROCEDURE — RXMED WILLOW AMBULATORY MEDICATION CHARGE

## 2024-04-24 PROCEDURE — RXMED WILLOW AMBULATORY MEDICATION CHARGE

## 2024-04-25 ENCOUNTER — PHARMACY VISIT (OUTPATIENT)
Dept: PHARMACY | Facility: CLINIC | Age: 49
End: 2024-04-25
Payer: COMMERCIAL

## 2024-05-14 ENCOUNTER — OFFICE VISIT (OUTPATIENT)
Dept: PAIN MEDICINE | Facility: CLINIC | Age: 49
End: 2024-05-14
Payer: COMMERCIAL

## 2024-05-14 DIAGNOSIS — M79.18 CERVICAL MYOFASCIAL PAIN SYNDROME: ICD-10-CM

## 2024-05-14 DIAGNOSIS — M96.1 CERVICAL POST-LAMINECTOMY SYNDROME: ICD-10-CM

## 2024-05-14 DIAGNOSIS — M75.81 RIGHT ROTATOR CUFF TENDINITIS: ICD-10-CM

## 2024-05-14 DIAGNOSIS — Z79.891 ENCOUNTER FOR LONG-TERM OPIATE ANALGESIC USE: ICD-10-CM

## 2024-05-14 DIAGNOSIS — Z79.891 LONG TERM CURRENT USE OF OPIATE ANALGESIC: ICD-10-CM

## 2024-05-14 DIAGNOSIS — M54.12 CERVICAL RADICULOPATHY: Primary | ICD-10-CM

## 2024-05-14 DIAGNOSIS — M79.2 NEUROGENIC PAIN: ICD-10-CM

## 2024-05-14 DIAGNOSIS — Z79.891 LONG TERM (CURRENT) USE OF OPIATE ANALGESIC: ICD-10-CM

## 2024-05-14 PROCEDURE — 99214 OFFICE O/P EST MOD 30 MIN: CPT | Performed by: PHYSICAL MEDICINE & REHABILITATION

## 2024-05-14 RX ORDER — PREGABALIN 150 MG/1
150 CAPSULE ORAL 2 TIMES DAILY
Qty: 56 CAPSULE | Refills: 1 | Status: SHIPPED | OUTPATIENT
Start: 2024-05-14 | End: 2024-06-20

## 2024-05-14 NOTE — PROGRESS NOTES
Chief complaint  Neck and RUL pain     History  Jared Abbott is back for pain management office visit  Continue with pain in the back of neck and RUL  With right cervical radic  Long discussion about putting effort in cutting back on pain medications. Discussed about pain level changing and we do not know if the medications at this amount are still needed until we try and cut back slowly on pain medications. If the cut is tolerated then we continue. If cut not tolerated then, will go back on the pain medications level.  The goal from this is to keep the pain medications at the lowest effective dose.   I discussed about  about considering increasing the dose of the long acting and cut back the number of doses of the short acting medications. That will decrease the number of doses being dispensed daily.       Pain level without medication is 7/10 , with the medication pain level 0/10.     The pain meds are helping control the pain and improving Activities of Daily living and quality of life and quality of sleep.    opioids treatment agreement Jan 2024  Pill count today, using count tray, and in front of patient :  16    pills , last fill was on 4/20  for 112 tabs,  the count is correct  Oarrs pulled and scanned in the chart  no concerns  last urine toxicology testing earlier this year and it was compliant we will repeat  Xray updated spine   ORT Score is  0  Pain pathology and pain generators spine  Modalities tried injection, surgery, physical therapy, TENS unit, nonsteroidal anti-inflammatory medication      Denied any fever or chills. No weight loss and no night sweats. No cough or sputum production. No diarrhea   The constipation has been responding to fibers and over the counter medications.     No bladder and bowel incontinence and no other changes in bladder and bowel. No skin changes.  Reports tiredness and fatigability only if the pain is not controlled.   Denied opioids diversion and abuse and denies  alcoholism. Denies overuse of the pain medications.    The control of the pain with the pain medications is helping the control of the symptoms and allowing the function and activities of daily living, enjoyment of life, improving the quality of life and sleep with less interruption by the pain. The goal is symptomatic control of the nonmalignant chronic pain and not to repair the permanent damage in the tissues inducing the chronic pain conditions. We are aiming to shift the focus from the nonmalignant chronic pain to other aspects of life by symptomatically treating this chronic pain. If this pain is not treated it will lead to major morbidity and it is also associated with increased risks of mortality. The patient understands those very clearly and also understand high risks of morbidity and mortality if not strictly adherent to the treatment recommendations and reporting any associated side effects. Also patient understand the full responsibility associated with these medications to avoid abuse or overuse or any use of these medications for anything besides treating the patient's own chronic pain and nothing else under any circumstances.        Physical examination  Awake, alert and oriented for time place and persons   declined Chaperone for the visit and was adequately  draped for the exam.    Examination of the cervical spine showed tight muscle bands on the right side limiting the range of motion of the cervical spine in flexion and bending to the right side because that increases the pain.  Spurling's maneuver increased the pain at the base of the neck and down the right upper limb on the radial aspect of the arm reaching above the elbow, forearm and the first interdigital space including the thumb and index finger. Similar findings with cervical root tension sign on the right side with the pain reaching down to the thumb and index finger.   Decreased sensory to light touch on the radial aspect of the right  forearm with the first interdigital webspace as well as the thumb and index finger.  Deep tendon reflexes showed decreased biceps and brachioradialis reflex.  The  triceps reflexes is  present.  Elbow flexion and supination are 4/5 on the right compared to 5/5 on the left.  The right  is slightly weaker compared to the left .  Tinel's sign over the median nerve at the wrist and ulnar nerve at the elbow are both negative.    No increased tendon reflexes in the lower limbs plantar cutaneous are downgoing bilaterally.         Diagnosis  Problem List Items Addressed This Visit       Cervical post-laminectomy syndrome    Relevant Medications    tapentadol (Nucynta) 75 mg tablet (Start on 5/18/2024)    tapentadol (Nucynta) 75 mg tablet (Start on 6/15/2024)    pregabalin (Lyrica) 150 mg capsule    Cervical myofascial pain syndrome    Relevant Medications    tapentadol (Nucynta) 75 mg tablet (Start on 5/18/2024)    tapentadol (Nucynta) 75 mg tablet (Start on 6/15/2024)    pregabalin (Lyrica) 150 mg capsule    Cervical radiculopathy - Primary    Relevant Medications    tapentadol (Nucynta) 75 mg tablet (Start on 5/18/2024)    tapentadol (Nucynta) 75 mg tablet (Start on 6/15/2024)    pregabalin (Lyrica) 150 mg capsule    Neurogenic pain    Relevant Medications    tapentadol (Nucynta) 75 mg tablet (Start on 5/18/2024)    tapentadol (Nucynta) 75 mg tablet (Start on 6/15/2024)    pregabalin (Lyrica) 150 mg capsule    Right rotator cuff tendinitis    Relevant Medications    tapentadol (Nucynta) 75 mg tablet (Start on 5/18/2024)    tapentadol (Nucynta) 75 mg tablet (Start on 6/15/2024)    pregabalin (Lyrica) 150 mg capsule    Long term (current) use of opiate analgesic    Relevant Medications    tapentadol (Nucynta) 75 mg tablet (Start on 5/18/2024)    tapentadol (Nucynta) 75 mg tablet (Start on 6/15/2024)    pregabalin (Lyrica) 150 mg capsule     Other Visit Diagnoses       Long term current use of opiate analgesic         Relevant Medications    tapentadol (Nucynta) 75 mg tablet (Start on 5/18/2024)    tapentadol (Nucynta) 75 mg tablet (Start on 6/15/2024)    pregabalin (Lyrica) 150 mg capsule    Encounter for long-term opiate analgesic use        Relevant Medications    tapentadol (Nucynta) 75 mg tablet (Start on 5/18/2024)    tapentadol (Nucynta) 75 mg tablet (Start on 6/15/2024)    pregabalin (Lyrica) 150 mg capsule             Plan  Reviewed the pain generators.  Went over the types of pain with neuropathic and nociceptive and different pathologies and therapeutic modalities. Discussed the mechanism of action of interventions from acupuncture, physical therapy , regular exercises, injections, botox, spinal cord stimulation, and role of surgery     Went over pathology of the intervertebral disc displacement and the anatomical relation to the Nerve roots and relation to the radicular symptoms. Went over treatment modalities with conservative treatment including acupuncture   and epidural steroid injection with fluoroscopy guidance and last resort of surgery    Based on the above findings and the clinical response to the opioids medications and improvement of the activities of daily living, sleep, and work performance. We made this complex decision to continue the opioids therapy in light of the evidence of the patient's responsibility in using the pain medications as prescribed for the nonmalignant chronic pain condition. We discussed about the use of the pain medications to treat the symptoms of chronic nonmalignant pain and we are not trying the repair the permanent damage in the tissues, rather we are trying to control the symptoms induced by the permanent damage to the tissues inducing the chronic pain condition and resulting disability. I explained the difference and discussed it with the patient and stressed the importance of knowing the difference especially because of the potential side effects and the potential addicting  effect and habit forming nature of the dangerous drugs we are using to treat the symptoms of the chronic pain.      We discussed that we are prescribing the medications on good stuart and legitimate medical reason.     We reviewed the side effects and precautions of opioids prescriptions as discussed in the opioids treatment agreement.    realizes the interaction between the therapeutic classes including the respiratory depression and potential death     Random drug testing   we will submit     Long discussion about putting effort in cutting back on pain medications. Discussed about pain level changing and we do not know if the medications at this amount are still needed until we try and cut back slowly on pain medications. If the cut is tolerated then we continue. If cut not tolerated then, will go back on the pain medications level.  The goal from this is to keep the pain medications at the lowest effective dose.   Continue with nucynta and lyrica has a narcan at home know how and when to use it if needed.  Discussed about considering cut back on pain medications     Discussed about NSAIDS and I explained about the opioids sparing effect to allow keeping the opioids dose at minimal effective dose.   I went over the potential side effects of the NSAIDS on the gastrointestinal, renal and cardiovascular systems.      I detailed the side effects from the acetaminophen in the medication and made aware of those. I also explained about the cumulative effects on the organs and mainly the liver.     Given the opioids therapy , we discussed about the risk for accidental over dose on the pain medications, either for patient or other household. I went over the mechanism of action and mode of use of the Naloxone according to the  recommendations. I will provide a prescription for a kit.     Follow-up 8 weeks or earlier if needed     The level of clinical decision making in this office visit,  is high, given the high  risks of complications with the morbidity and mortality due to the fact that acute and chronic pain may pose a threat to life and bodily function, if under treated, poorly treated, or with failure to maintain adequate treatment and timely medical follow up. Additionally over treatment has its own set of complications including overdosing on the pain medications and also the habit forming potentials with the use of the medications used to treat chronic painful conditions including therapeutic classes classified as dangerous medications. Given the serious and fluctuating nature of pain level and instensity with extensive consideration for whenever pain changes, there is always the risk of prolonged functional impairment requiring close patient monitoring with regular assessments and reassessments and high level medical decision making at every office visit. The amount and complexity of data reviewed is high given the patient clinical presentation, labs,  data, radiology reports, and other tests as discussed during office visits. Pertinent data whether positive or negative were taken in consideration in the process of making this high level medical decision.

## 2024-05-18 ENCOUNTER — PHARMACY VISIT (OUTPATIENT)
Dept: PHARMACY | Facility: CLINIC | Age: 49
End: 2024-05-18
Payer: COMMERCIAL

## 2024-05-18 PROCEDURE — RXMED WILLOW AMBULATORY MEDICATION CHARGE

## 2024-05-22 PROCEDURE — RXMED WILLOW AMBULATORY MEDICATION CHARGE

## 2024-05-23 ENCOUNTER — PHARMACY VISIT (OUTPATIENT)
Dept: PHARMACY | Facility: CLINIC | Age: 49
End: 2024-05-23
Payer: COMMERCIAL

## 2024-06-15 ENCOUNTER — PHARMACY VISIT (OUTPATIENT)
Dept: PHARMACY | Facility: CLINIC | Age: 49
End: 2024-06-15
Payer: COMMERCIAL

## 2024-06-15 PROCEDURE — RXMED WILLOW AMBULATORY MEDICATION CHARGE

## 2024-06-19 ENCOUNTER — PHARMACY VISIT (OUTPATIENT)
Dept: PHARMACY | Facility: CLINIC | Age: 49
End: 2024-06-19
Payer: COMMERCIAL

## 2024-06-19 PROCEDURE — RXMED WILLOW AMBULATORY MEDICATION CHARGE

## 2024-07-09 ENCOUNTER — LAB (OUTPATIENT)
Dept: LAB | Facility: LAB | Age: 49
End: 2024-07-09
Payer: COMMERCIAL

## 2024-07-09 ENCOUNTER — APPOINTMENT (OUTPATIENT)
Dept: PAIN MEDICINE | Facility: CLINIC | Age: 49
End: 2024-07-09
Payer: COMMERCIAL

## 2024-07-09 DIAGNOSIS — M96.1 CERVICAL POST-LAMINECTOMY SYNDROME: ICD-10-CM

## 2024-07-09 DIAGNOSIS — Z79.891 LONG TERM CURRENT USE OF OPIATE ANALGESIC: ICD-10-CM

## 2024-07-09 DIAGNOSIS — M75.81 RIGHT ROTATOR CUFF TENDINITIS: Primary | ICD-10-CM

## 2024-07-09 DIAGNOSIS — M79.2 NEUROGENIC PAIN: ICD-10-CM

## 2024-07-09 DIAGNOSIS — Z79.891 LONG TERM (CURRENT) USE OF OPIATE ANALGESIC: ICD-10-CM

## 2024-07-09 DIAGNOSIS — M79.18 CERVICAL MYOFASCIAL PAIN SYNDROME: ICD-10-CM

## 2024-07-09 DIAGNOSIS — Z79.891 ENCOUNTER FOR LONG-TERM OPIATE ANALGESIC USE: ICD-10-CM

## 2024-07-09 DIAGNOSIS — M54.12 CERVICAL RADICULOPATHY: ICD-10-CM

## 2024-07-09 LAB
AMPHETAMINES UR QL SCN: NORMAL
BARBITURATES UR QL SCN: NORMAL
BZE UR QL SCN: NORMAL
CANNABINOIDS UR QL SCN: NORMAL
CREAT UR-MCNC: 70.4 MG/DL (ref 20–370)
PCP UR QL SCN: NORMAL

## 2024-07-09 PROCEDURE — 99214 OFFICE O/P EST MOD 30 MIN: CPT | Performed by: PHYSICAL MEDICINE & REHABILITATION

## 2024-07-09 PROCEDURE — 80354 DRUG SCREENING FENTANYL: CPT

## 2024-07-09 PROCEDURE — 80373 DRUG SCREENING TRAMADOL: CPT

## 2024-07-09 PROCEDURE — 80346 BENZODIAZEPINES1-12: CPT

## 2024-07-09 PROCEDURE — 82570 ASSAY OF URINE CREATININE: CPT

## 2024-07-09 PROCEDURE — 80361 OPIATES 1 OR MORE: CPT

## 2024-07-09 PROCEDURE — 80365 DRUG SCREENING OXYCODONE: CPT

## 2024-07-09 PROCEDURE — 80307 DRUG TEST PRSMV CHEM ANLYZR: CPT

## 2024-07-09 PROCEDURE — 80368 SEDATIVE HYPNOTICS: CPT

## 2024-07-09 PROCEDURE — 80358 DRUG SCREENING METHADONE: CPT

## 2024-07-09 RX ORDER — PREGABALIN 150 MG/1
150 CAPSULE ORAL 2 TIMES DAILY
Qty: 56 CAPSULE | Refills: 1 | Status: SHIPPED | OUTPATIENT
Start: 2024-07-16 | End: 2024-08-13

## 2024-07-09 NOTE — PROGRESS NOTES
Chief complaint  Neck and MARGUERITE pain     History  Jared Abbott is back for pain management office visit  Continues with pain in the RUL and R shoulder blade  The pain at the base of the neck is associated with deep stabbing sensation along with tight muscles limiting the range of motion of the neck mainly in flexion and sidebending.  This is associated with burning sensation going down the radial aspect of the right upper limb reaching above the elbow. No reported consistent symptoms beyond the elbow to the forearm, wrists or fingers. The pain worsens with reaching overhead or with bending with the neck forward and to the side.  Rotation of the neck is limited by the tight muscles at the base of the neck and also by increased pain to the right upper limb.  This is associated with weakness with the right shoulder and elbow flexion and external rotation.  No clear weakness in the  and the manual dexterity of the right hand is not affected.  No reported dropping objects from the hand.  No reported difficulty with the gait and no trouble with bowel or bladder continence.    Long discussion about putting effort in cutting back on pain medications. Discussed about pain level changing and we do not know if the medications at this amount are still needed until we try and cut back slowly on pain medications. If the cut is tolerated then we continue. If cut not tolerated then, will go back on the pain medications level.  The goal from this is to keep the pain medications at the lowest effective dose.        Pain level without medication is 8/10 , with the medication pain level 3/10.     The pain meds are helping control the pain and improving Activities of Daily living and quality of life and quality of sleep.    opioids treatment agreement Jan 2024     Oarrs pulled and reviewed, no concerns  last urine toxicology testing earlier this year and it was compliant we will repeat  Xray updated spine   ORT Score is   "0  Pain pathology and pain generators spine   Modalities tried injection, surgery, physical therapy, TENS unit, nonsteroidal anti-inflammatory medication       Review of Systems :  Denied any fever or chills. No weight loss and no night sweats. No cough or sputum production. No diarrhea   The constipation has been responding to fibers and over the counter medications.     No bladder and bowel incontinence and no other changes in bladder and bowel. No skin changes.  Reports tiredness and fatigability only if the pain is not controlled.   Denied opioids diversion and abuse and denies alcoholism. Denies overuse of the pain medications.  No reported euphoria sensation or getting a \"high\" on the pain medications.    The control of the pain with the pain medications is helping the control of the symptoms and allowing the function and activities of daily living, enjoyment of life, improving the quality of life and sleep with less interruption by the pain. The goal is symptomatic control of the nonmalignant chronic pain and not to repair the permanent damage in the tissues inducing the chronic pain conditions. We are aiming to shift the focus from the nonmalignant chronic pain to other aspects of life by symptomatically treating this chronic pain. If this pain is not treated it will lead to major morbidity and it is also associated with increased risks of mortality. The patient understands those very clearly and also understand high risks of morbidity and mortality if not strictly adherent to the treatment recommendations and reporting any associated side effects. Also patient understand the full responsibility associated with these medications to avoid abuse or overuse or any use of these medications for anything besides treating the patient's own chronic pain and nothing else under any circumstances.        Physical examination  Awake, alert and oriented for time place and persons   declined Chaperone for the visit and " was adequately  draped for the exam.    Examination of the cervical spine showed tight muscle bands on the right side limiting the range of motion of the cervical spine in flexion and bending to the right side because that increases the pain.  Spurling's maneuver increased the pain at the base of the neck and down the right upper limb on the radial aspect of the arm reaching above the elbow.  Similar finding with cervical root tension sign on the right side.  No reported pain below the elbow.  Decreased sensory to light touch on the radial aspect of the right arm.  Right shoulder abduction and flexion is 4/5, elbow flexion on the right is slightly weaker compared to the left side.  Deep tendon reflexes showed decreased biceps reflex.  The brachioradialis and the triceps reflexes are present.  No sensorimotor deficits below the elbow.  Good  bilaterally.  Tinel's sign over the median nerve at the wrist and ulnar nerve at the elbow are both negative.    No increased tendon reflexes in the lower limbs plantar cutaneous are downgoing bilaterally.        Diagnosis  Problem List Items Addressed This Visit       Cervical post-laminectomy syndrome    Relevant Medications    tapentadol (Nucynta) 75 mg tablet (Start on 7/13/2024)    tapentadol (Nucynta) 75 mg tablet (Start on 8/10/2024)    pregabalin (Lyrica) 150 mg capsule (Start on 7/16/2024)    Cervical myofascial pain syndrome    Relevant Medications    tapentadol (Nucynta) 75 mg tablet (Start on 7/13/2024)    tapentadol (Nucynta) 75 mg tablet (Start on 8/10/2024)    pregabalin (Lyrica) 150 mg capsule (Start on 7/16/2024)    Cervical radiculopathy    Relevant Medications    tapentadol (Nucynta) 75 mg tablet (Start on 7/13/2024)    tapentadol (Nucynta) 75 mg tablet (Start on 8/10/2024)    pregabalin (Lyrica) 150 mg capsule (Start on 7/16/2024)    Neurogenic pain    Relevant Medications    tapentadol (Nucynta) 75 mg tablet (Start on 7/13/2024)    tapentadol (Nucynta)  75 mg tablet (Start on 8/10/2024)    pregabalin (Lyrica) 150 mg capsule (Start on 7/16/2024)    Right rotator cuff tendinitis - Primary    Relevant Medications    tapentadol (Nucynta) 75 mg tablet (Start on 7/13/2024)    tapentadol (Nucynta) 75 mg tablet (Start on 8/10/2024)    pregabalin (Lyrica) 150 mg capsule (Start on 7/16/2024)    Long term (current) use of opiate analgesic    Relevant Medications    tapentadol (Nucynta) 75 mg tablet (Start on 7/13/2024)    tapentadol (Nucynta) 75 mg tablet (Start on 8/10/2024)    pregabalin (Lyrica) 150 mg capsule (Start on 7/16/2024)    Other Relevant Orders    Opiate/Opioid/Benzo Prescription Compliance     Other Visit Diagnoses       Long term current use of opiate analgesic        Relevant Medications    tapentadol (Nucynta) 75 mg tablet (Start on 7/13/2024)    tapentadol (Nucynta) 75 mg tablet (Start on 8/10/2024)    pregabalin (Lyrica) 150 mg capsule (Start on 7/16/2024)    Encounter for long-term opiate analgesic use        Relevant Medications    tapentadol (Nucynta) 75 mg tablet (Start on 7/13/2024)    tapentadol (Nucynta) 75 mg tablet (Start on 8/10/2024)    pregabalin (Lyrica) 150 mg capsule (Start on 7/16/2024)             Plan  Reviewed the pain generators.  Went over the types of pain with neuropathic and nociceptive and different pathologies and therapeutic modalities. Discussed the mechanism of action of interventions from acupuncture, physical therapy , regular exercises, injections, botox, spinal cord stimulation, and role of surgery     Went over pathology of the intervertebral disc displacement and the anatomical relation to the Nerve roots and relation to the radicular symptoms. Went over treatment modalities with conservative treatment including acupuncture   and epidural steroid injection with fluoroscopy guidance and last resort of surgery    Based on the above findings and the clinical response to the opioids medications and improvement of the  activities of daily living, sleep, and work performance. We made this complex decision to continue the opioids therapy in light of the evidence of the patient's responsibility in using the pain medications as prescribed for the nonmalignant chronic pain condition. We discussed about the use of the pain medications to treat the symptoms of chronic nonmalignant pain and we are not trying the repair the permanent damage in the tissues, rather we are trying to control the symptoms induced by the permanent damage to the tissues inducing the chronic pain condition and resulting disability. I explained the difference and discussed it with the patient and stressed the importance of knowing the difference especially because of the potential side effects and the potential addicting effect and habit forming nature of the dangerous drugs we are using to treat the symptoms of the chronic pain.      We discussed that we are prescribing the medications on good stuart and legitimate medical reason.     We reviewed the side effects and precautions of opioids prescriptions as discussed in the opioids treatment agreement.    realizes the interaction between the therapeutic classes including the respiratory depression and potential death     Random drug testing   we will submit     Continue with nucynta and lyrica for neuropathic and nociceptic  realizes the interaction between the therapeutic classes including the respiratory depression and potential death     Discussed about NSAIDS and I explained about the opioids sparing effect to allow keeping the opioids dose at minimal effective dose.   I went over the potential side effects of the NSAIDS on the gastrointestinal, renal and cardiovascular systems.      I detailed the side effects from the acetaminophen in the medication and made aware of those. I also explained about the cumulative effects on the organs and mainly the liver.     Given the opioids therapy , we discussed about the  risk for accidental over dose on the pain medications, either for patient or other household. I went over the mechanism of action and mode of use of the Naloxone according to the  recommendations. I will provide a prescription for a kit.     Follow-up 8 weeks or earlier if needed     The level of clinical decision making in this office visit,  is high, given the high risks of complications with the morbidity and mortality due to the fact that acute and chronic pain may pose a threat to life and bodily function, if under treated, poorly treated, or with failure to maintain adequate treatment and timely medical follow up. Additionally over treatment has its own set of complications including overdosing on the pain medications and also the habit forming potentials with the use of the medications used to treat chronic painful conditions including therapeutic classes classified as dangerous medications. Given the serious and fluctuating nature of pain level and instensity with extensive consideration for whenever pain changes, there is always the risk of prolonged functional impairment requiring close patient monitoring with regular assessments and reassessments and high level medical decision making at every office visit. The amount and complexity of data reviewed is high given the patient clinical presentation, labs,  data, radiology reports, and other tests as discussed during office visits. Pertinent data whether positive or negative were taken in consideration in the process of making this high level medical decision.

## 2024-07-13 ENCOUNTER — PHARMACY VISIT (OUTPATIENT)
Dept: PHARMACY | Facility: CLINIC | Age: 49
End: 2024-07-13
Payer: COMMERCIAL

## 2024-07-13 PROCEDURE — RXMED WILLOW AMBULATORY MEDICATION CHARGE

## 2024-07-16 PROCEDURE — RXMED WILLOW AMBULATORY MEDICATION CHARGE

## 2024-07-18 ENCOUNTER — PHARMACY VISIT (OUTPATIENT)
Dept: PHARMACY | Facility: CLINIC | Age: 49
End: 2024-07-18
Payer: COMMERCIAL

## 2024-08-06 DIAGNOSIS — M79.2 NEUROGENIC PAIN: ICD-10-CM

## 2024-08-06 DIAGNOSIS — M96.1 CERVICAL POST-LAMINECTOMY SYNDROME: ICD-10-CM

## 2024-08-06 DIAGNOSIS — M75.81 RIGHT ROTATOR CUFF TENDINITIS: ICD-10-CM

## 2024-08-06 DIAGNOSIS — Z79.891 LONG TERM CURRENT USE OF OPIATE ANALGESIC: ICD-10-CM

## 2024-08-06 DIAGNOSIS — Z79.891 ENCOUNTER FOR LONG-TERM OPIATE ANALGESIC USE: ICD-10-CM

## 2024-08-06 DIAGNOSIS — M79.18 CERVICAL MYOFASCIAL PAIN SYNDROME: ICD-10-CM

## 2024-08-06 DIAGNOSIS — Z79.891 LONG TERM (CURRENT) USE OF OPIATE ANALGESIC: ICD-10-CM

## 2024-08-06 DIAGNOSIS — M54.12 CERVICAL RADICULOPATHY: ICD-10-CM

## 2024-08-10 ENCOUNTER — PHARMACY VISIT (OUTPATIENT)
Dept: PHARMACY | Facility: CLINIC | Age: 49
End: 2024-08-10
Payer: COMMERCIAL

## 2024-08-10 PROCEDURE — RXMED WILLOW AMBULATORY MEDICATION CHARGE

## 2024-08-14 ENCOUNTER — PHARMACY VISIT (OUTPATIENT)
Dept: PHARMACY | Facility: CLINIC | Age: 49
End: 2024-08-14
Payer: COMMERCIAL

## 2024-08-14 PROCEDURE — RXMED WILLOW AMBULATORY MEDICATION CHARGE

## 2024-09-03 ENCOUNTER — APPOINTMENT (OUTPATIENT)
Dept: PAIN MEDICINE | Facility: CLINIC | Age: 49
End: 2024-09-03
Payer: COMMERCIAL

## 2024-09-03 DIAGNOSIS — Z79.891 LONG TERM (CURRENT) USE OF OPIATE ANALGESIC: ICD-10-CM

## 2024-09-03 DIAGNOSIS — R29.898 WEAKNESS OF BOTH LOWER EXTREMITIES: ICD-10-CM

## 2024-09-03 DIAGNOSIS — Z79.891 LONG TERM CURRENT USE OF OPIATE ANALGESIC: ICD-10-CM

## 2024-09-03 DIAGNOSIS — M54.12 CERVICAL RADICULOPATHY: ICD-10-CM

## 2024-09-03 DIAGNOSIS — M75.81 RIGHT ROTATOR CUFF TENDINITIS: ICD-10-CM

## 2024-09-03 DIAGNOSIS — M96.1 CERVICAL POST-LAMINECTOMY SYNDROME: Primary | ICD-10-CM

## 2024-09-03 DIAGNOSIS — M79.18 CERVICAL MYOFASCIAL PAIN SYNDROME: ICD-10-CM

## 2024-09-03 DIAGNOSIS — Z79.891 ENCOUNTER FOR LONG-TERM OPIATE ANALGESIC USE: ICD-10-CM

## 2024-09-03 DIAGNOSIS — M79.2 NEUROGENIC PAIN: ICD-10-CM

## 2024-09-03 PROCEDURE — 99214 OFFICE O/P EST MOD 30 MIN: CPT | Performed by: PHYSICAL MEDICINE & REHABILITATION

## 2024-09-03 RX ORDER — PREGABALIN 150 MG/1
150 CAPSULE ORAL 2 TIMES DAILY
Qty: 56 CAPSULE | Refills: 1 | Status: SHIPPED | OUTPATIENT
Start: 2024-09-11 | End: 2024-10-09

## 2024-09-03 NOTE — PROGRESS NOTES
Chief complaint  Neck and RUL pain     History  Jared Abbott is back for pain management office visit  Continues with neck pain in the neck and RUL  The pain at the base of the neck is associated with deep stabbing sensation along with tight muscles limiting the range of motion of the neck mainly in flexion and sidebending.  This is associated with burning sensation going down the radial aspect of the right upper limb reaching the elbow, forearm, radial aspect of the right wrist into the first digital space along with the thumb and index.  The pain worsens with reaching overhead or with bending with the neck forward and to the side.  Rotation of the neck is limited by the tight muscles at the base of the neck and also by increased pain to the upper limb.  This is associated with weakness with the right elbow flexion along with weakened  and decreased manual dexterity with the right hand.  Occasionally dropping objects if not careful with handling using the right hand.  No reported difficulty with the gait and no trouble with bowel or bladder continence.     Long discussion about putting effort in cutting back on pain medications. Discussed about pain level changing and we do not know if the medications at this amount are still needed until we try and cut back slowly on pain medications. If the cut is tolerated then we continue. If cut not tolerated then, will go back on the pain medications level.  The goal from this is to keep the pain medications at the lowest effective dose.       Pain level without medication is 8/10 , with the medication pain level 3 to 4 /10.     The pain meds are helping control the pain and improving Activities of Daily living and quality of life and quality of sleep.    opioids treatment agreement Jan 2024  Pill count today, using count tray, and in front of patient :  17 of lyrica filled on 8/14 for 56    pills , and nucyntat 18 pills filled on 112 filled on 8/10,  the count is  "correct  Oarrs pulled and reviewed, no concerns  last urine toxicology testing earlier this year and it was compliant we will repeat  Xray updated spine  ORT Score is  0  Pain pathology and pain generators spine   Modalities tried injection, surgery, physical therapy, TENS unit, nonsteroidal anti-inflammatory medication       Review of Systems :  Denied any fever or chills. No weight loss and no night sweats. No cough or sputum production. No diarrhea   The constipation has been responding to fibers and over the counter medications.     No bladder and bowel incontinence and no other changes in bladder and bowel. No skin changes.  Reports tiredness and fatigability only if the pain is not controlled.     Denied opioids diversion and abuse and denies alcoholism. Denies overuse of the pain medications.  No reported euphoria sensation or getting a \"high\" on the pain medications.    The control of the pain with the pain medications is helping the control of the symptoms and allowing the function and activities of daily living, enjoyment of life, improving the quality of life and sleep with less interruption by the pain. The goal is symptomatic control of the nonmalignant chronic pain and not to repair the permanent damage in the tissues inducing the chronic pain conditions. We are aiming to shift the focus from the nonmalignant chronic pain to other aspects of life by symptomatically treating this chronic pain. If this pain is not treated it will lead to major morbidity and it is also associated with increased risks of mortality. The patient understands those very clearly and also understand high risks of morbidity and mortality if not strictly adherent to the treatment recommendations and reporting any associated side effects. Also patient understand the full responsibility associated with these medications to avoid abuse or overuse or any use of these medications for anything besides treating the patient's own chronic " pain and nothing else under any circumstances.        Physical examination  Awake, alert and oriented for time place and persons   declined Chaperone for the visit and was adequately  draped for the exam.    Examination of the cervical spine showed tight muscle bands on the right side limiting the range of motion of the cervical spine in flexion and bending to the right side because that increases the pain.  Spurling's maneuver increased the pain at the base of the neck and down the right upper limb on the radial aspect of the arm reaching above the elbow, forearm and the first interdigital space including the thumb and index finger. Similar findings with cervical root tension sign on the right side with the pain reaching down to the thumb and index finger.   Decreased sensory to light touch on the radial aspect of the right forearm with the first interdigital webspace as well as the thumb and index finger.  Deep tendon reflexes showed decreased biceps and brachioradialis reflex.  The  triceps reflexes is  present.  Elbow flexion and supination are 4/5 on the right compared to 5/5 on the left.  The right  is slightly weaker compared to the left .  Tinel's sign over the median nerve at the wrist and ulnar nerve at the elbow are both negative.    No increased tendon reflexes in the lower limbs plantar cutaneous are downgoing bilaterally.         Diagnosis  Problem List Items Addressed This Visit       Cervical post-laminectomy syndrome - Primary    Relevant Medications    tapentadol (Nucynta) 75 mg tablet (Start on 9/7/2024)    tapentadol (Nucynta) 75 mg tablet (Start on 10/5/2024)    pregabalin (Lyrica) 150 mg capsule (Start on 9/11/2024)    Cervical myofascial pain syndrome    Relevant Medications    tapentadol (Nucynta) 75 mg tablet (Start on 9/7/2024)    tapentadol (Nucynta) 75 mg tablet (Start on 10/5/2024)    pregabalin (Lyrica) 150 mg capsule (Start on 9/11/2024)    Cervical radiculopathy    Relevant  Medications    tapentadol (Nucynta) 75 mg tablet (Start on 9/7/2024)    tapentadol (Nucynta) 75 mg tablet (Start on 10/5/2024)    pregabalin (Lyrica) 150 mg capsule (Start on 9/11/2024)    Neurogenic pain    Relevant Medications    tapentadol (Nucynta) 75 mg tablet (Start on 9/7/2024)    tapentadol (Nucynta) 75 mg tablet (Start on 10/5/2024)    pregabalin (Lyrica) 150 mg capsule (Start on 9/11/2024)    Right rotator cuff tendinitis    Relevant Medications    tapentadol (Nucynta) 75 mg tablet (Start on 9/7/2024)    tapentadol (Nucynta) 75 mg tablet (Start on 10/5/2024)    pregabalin (Lyrica) 150 mg capsule (Start on 9/11/2024)    Weakness of both lower extremities    Long term (current) use of opiate analgesic    Relevant Medications    tapentadol (Nucynta) 75 mg tablet (Start on 9/7/2024)    tapentadol (Nucynta) 75 mg tablet (Start on 10/5/2024)    pregabalin (Lyrica) 150 mg capsule (Start on 9/11/2024)     Other Visit Diagnoses       Long term current use of opiate analgesic        Relevant Medications    tapentadol (Nucynta) 75 mg tablet (Start on 9/7/2024)    tapentadol (Nucynta) 75 mg tablet (Start on 10/5/2024)    pregabalin (Lyrica) 150 mg capsule (Start on 9/11/2024)    Encounter for long-term opiate analgesic use        Relevant Medications    tapentadol (Nucynta) 75 mg tablet (Start on 9/7/2024)    tapentadol (Nucynta) 75 mg tablet (Start on 10/5/2024)    pregabalin (Lyrica) 150 mg capsule (Start on 9/11/2024)             Plan  Reviewed the pain generators.  Went over the types of pain with neuropathic and nociceptive and different pathologies and therapeutic modalities. Discussed the mechanism of action of interventions from acupuncture, physical therapy , regular exercises, injections, botox, spinal cord stimulation, and role of surgery     Went over pathology of the intervertebral disc displacement and the anatomical relation to the Nerve roots and relation to the radicular symptoms. Went over treatment  modalities with conservative treatment including acupuncture   and epidural steroid injection with fluoroscopy guidance and last resort of surgery    Based on the above findings and the clinical response to the opioids medications and improvement of the activities of daily living, sleep, and work performance. We made this complex decision to continue the opioids therapy in light of the evidence of the patient's responsibility in using the pain medications as prescribed for the nonmalignant chronic pain condition. We discussed about the use of the pain medications to treat the symptoms of chronic nonmalignant pain and we are not trying the repair the permanent damage in the tissues, rather we are trying to control the symptoms induced by the permanent damage to the tissues inducing the chronic pain condition and resulting disability. I explained the difference and discussed it with the patient and stressed the importance of knowing the difference especially because of the potential side effects and the potential addicting effect and habit forming nature of the dangerous drugs we are using to treat the symptoms of the chronic pain.      We discussed that we are prescribing the medications on good stuart and legitimate medical reason.     We reviewed the side effects and precautions of opioids prescriptions as discussed in the opioids treatment agreement.    realizes the interaction between the therapeutic classes including the respiratory depression and potential death     Random drug testing   we will submit     Consider SCS but he wanted to holdoff  Long discussion about putting effort in cutting back on pain medications. Discussed about pain level changing and we do not know if the medications at this amount are still needed until we try and cut back slowly on pain medications. If the cut is tolerated then we continue. If cut not tolerated then, will go back on the pain medications level.  The goal from this is to  keep the pain medications at the lowest effective dose.   realizes the interaction between the therapeutic classes including the respiratory depression and potential death     Discussed about NSAIDS and I explained about the opioids sparing effect to allow keeping the opioids dose at minimal effective dose.   I went over the potential side effects of the NSAIDS on the gastrointestinal, renal and cardiovascular systems.      I detailed the side effects from the acetaminophen in the medication and made aware of those. I also explained about the cumulative effects on the organs and mainly the liver.     Given the opioids therapy , we discussed about the risk for accidental over dose on the pain medications, either for patient or other household. I went over the mechanism of action and mode of use of the Naloxone according to the  recommendations. I will provide a prescription for a kit.     Follow-up 8 weeks or earlier if needed     The level of clinical decision making in this office visit,  is high, given the high risks of complications with the morbidity and mortality due to the fact that acute and chronic pain may pose a threat to life and bodily function, if under treated, poorly treated, or with failure to maintain adequate treatment and timely medical follow up. Additionally over treatment has its own set of complications including overdosing on the pain medications and also the habit forming potentials with the use of the medications used to treat chronic painful conditions including therapeutic classes classified as dangerous medications. Given the serious and fluctuating nature of pain level and instensity with extensive consideration for whenever pain changes, there is always the risk of prolonged functional impairment requiring close patient monitoring with regular assessments and reassessments and high level medical decision making at every office visit. The amount and complexity of data  reviewed is high given the patient clinical presentation, labs,  data, radiology reports, and other tests as discussed during office visits. Pertinent data whether positive or negative were taken in consideration in the process of making this high level medical decision.

## 2024-09-07 ENCOUNTER — PHARMACY VISIT (OUTPATIENT)
Dept: PHARMACY | Facility: CLINIC | Age: 49
End: 2024-09-07
Payer: COMMERCIAL

## 2024-09-07 PROCEDURE — RXMED WILLOW AMBULATORY MEDICATION CHARGE

## 2024-09-12 ENCOUNTER — PHARMACY VISIT (OUTPATIENT)
Dept: PHARMACY | Facility: CLINIC | Age: 49
End: 2024-09-12
Payer: COMMERCIAL

## 2024-09-12 PROCEDURE — RXMED WILLOW AMBULATORY MEDICATION CHARGE

## 2024-10-04 DIAGNOSIS — M75.81 RIGHT ROTATOR CUFF TENDINITIS: ICD-10-CM

## 2024-10-04 DIAGNOSIS — Z79.891 LONG TERM CURRENT USE OF OPIATE ANALGESIC: ICD-10-CM

## 2024-10-04 DIAGNOSIS — Z79.891 ENCOUNTER FOR LONG-TERM OPIATE ANALGESIC USE: ICD-10-CM

## 2024-10-04 DIAGNOSIS — M79.2 NEUROGENIC PAIN: ICD-10-CM

## 2024-10-04 DIAGNOSIS — M54.12 CERVICAL RADICULOPATHY: ICD-10-CM

## 2024-10-04 DIAGNOSIS — M96.1 CERVICAL POST-LAMINECTOMY SYNDROME: ICD-10-CM

## 2024-10-04 DIAGNOSIS — Z79.891 LONG TERM (CURRENT) USE OF OPIATE ANALGESIC: ICD-10-CM

## 2024-10-04 DIAGNOSIS — M79.18 CERVICAL MYOFASCIAL PAIN SYNDROME: ICD-10-CM

## 2024-10-05 ENCOUNTER — PHARMACY VISIT (OUTPATIENT)
Dept: PHARMACY | Facility: CLINIC | Age: 49
End: 2024-10-05
Payer: COMMERCIAL

## 2024-10-05 PROCEDURE — RXMED WILLOW AMBULATORY MEDICATION CHARGE

## 2024-10-29 ENCOUNTER — APPOINTMENT (OUTPATIENT)
Dept: PAIN MEDICINE | Facility: CLINIC | Age: 49
End: 2024-10-29
Payer: COMMERCIAL

## 2024-10-29 DIAGNOSIS — M79.18 CERVICAL MYOFASCIAL PAIN SYNDROME: ICD-10-CM

## 2024-10-29 DIAGNOSIS — Z79.891 LONG TERM (CURRENT) USE OF OPIATE ANALGESIC: ICD-10-CM

## 2024-10-29 DIAGNOSIS — Z79.891 ENCOUNTER FOR LONG-TERM OPIATE ANALGESIC USE: ICD-10-CM

## 2024-10-29 DIAGNOSIS — Z79.891 LONG TERM CURRENT USE OF OPIATE ANALGESIC: Primary | ICD-10-CM

## 2024-10-29 DIAGNOSIS — M54.12 CERVICAL RADICULOPATHY: ICD-10-CM

## 2024-10-29 DIAGNOSIS — M79.2 NEUROGENIC PAIN: ICD-10-CM

## 2024-10-29 DIAGNOSIS — M75.81 RIGHT ROTATOR CUFF TENDINITIS: ICD-10-CM

## 2024-10-29 DIAGNOSIS — M96.1 CERVICAL POST-LAMINECTOMY SYNDROME: ICD-10-CM

## 2024-10-29 PROCEDURE — 99214 OFFICE O/P EST MOD 30 MIN: CPT | Performed by: PHYSICAL MEDICINE & REHABILITATION

## 2024-11-02 ENCOUNTER — PHARMACY VISIT (OUTPATIENT)
Dept: PHARMACY | Facility: CLINIC | Age: 49
End: 2024-11-02
Payer: COMMERCIAL

## 2024-11-02 PROCEDURE — RXMED WILLOW AMBULATORY MEDICATION CHARGE

## 2024-11-04 DIAGNOSIS — Z79.891 ENCOUNTER FOR LONG-TERM OPIATE ANALGESIC USE: ICD-10-CM

## 2024-11-04 DIAGNOSIS — M96.1 CERVICAL POST-LAMINECTOMY SYNDROME: ICD-10-CM

## 2024-11-04 DIAGNOSIS — Z79.891 LONG TERM (CURRENT) USE OF OPIATE ANALGESIC: ICD-10-CM

## 2024-11-04 DIAGNOSIS — M75.81 RIGHT ROTATOR CUFF TENDINITIS: ICD-10-CM

## 2024-11-04 DIAGNOSIS — M54.12 CERVICAL RADICULOPATHY: ICD-10-CM

## 2024-11-04 DIAGNOSIS — Z79.891 LONG TERM CURRENT USE OF OPIATE ANALGESIC: ICD-10-CM

## 2024-11-04 DIAGNOSIS — M79.18 CERVICAL MYOFASCIAL PAIN SYNDROME: ICD-10-CM

## 2024-11-04 DIAGNOSIS — M79.2 NEUROGENIC PAIN: ICD-10-CM

## 2024-11-04 RX ORDER — PREGABALIN 150 MG/1
150 CAPSULE ORAL 2 TIMES DAILY
Qty: 56 CAPSULE | Refills: 1 | Status: SHIPPED | OUTPATIENT
Start: 2024-11-04 | End: 2024-12-02

## 2024-11-06 ENCOUNTER — PHARMACY VISIT (OUTPATIENT)
Dept: PHARMACY | Facility: CLINIC | Age: 49
End: 2024-11-06
Payer: COMMERCIAL

## 2024-11-06 PROCEDURE — RXMED WILLOW AMBULATORY MEDICATION CHARGE

## 2024-11-30 ENCOUNTER — PHARMACY VISIT (OUTPATIENT)
Dept: PHARMACY | Facility: CLINIC | Age: 49
End: 2024-11-30
Payer: COMMERCIAL

## 2024-11-30 PROCEDURE — RXMED WILLOW AMBULATORY MEDICATION CHARGE

## 2024-12-04 ENCOUNTER — PHARMACY VISIT (OUTPATIENT)
Dept: PHARMACY | Facility: CLINIC | Age: 49
End: 2024-12-04
Payer: COMMERCIAL

## 2024-12-17 ENCOUNTER — LAB (OUTPATIENT)
Dept: LAB | Facility: LAB | Age: 49
End: 2024-12-17
Payer: COMMERCIAL

## 2024-12-17 ENCOUNTER — APPOINTMENT (OUTPATIENT)
Dept: PAIN MEDICINE | Facility: CLINIC | Age: 49
End: 2024-12-17
Payer: COMMERCIAL

## 2024-12-17 VITALS
HEART RATE: 74 BPM | SYSTOLIC BLOOD PRESSURE: 122 MMHG | HEIGHT: 70 IN | WEIGHT: 200 LBS | BODY MASS INDEX: 28.63 KG/M2 | DIASTOLIC BLOOD PRESSURE: 87 MMHG | OXYGEN SATURATION: 96 %

## 2024-12-17 DIAGNOSIS — Z79.891 LONG TERM CURRENT USE OF OPIATE ANALGESIC: ICD-10-CM

## 2024-12-17 DIAGNOSIS — M54.12 CERVICAL RADICULOPATHY: ICD-10-CM

## 2024-12-17 DIAGNOSIS — M96.1 CERVICAL POST-LAMINECTOMY SYNDROME: ICD-10-CM

## 2024-12-17 DIAGNOSIS — M75.81 RIGHT ROTATOR CUFF TENDINITIS: ICD-10-CM

## 2024-12-17 DIAGNOSIS — Z79.891 LONG TERM (CURRENT) USE OF OPIATE ANALGESIC: ICD-10-CM

## 2024-12-17 DIAGNOSIS — Z79.891 ENCOUNTER FOR LONG-TERM OPIATE ANALGESIC USE: ICD-10-CM

## 2024-12-17 DIAGNOSIS — M79.2 NEUROGENIC PAIN: ICD-10-CM

## 2024-12-17 DIAGNOSIS — M79.18 CERVICAL MYOFASCIAL PAIN SYNDROME: ICD-10-CM

## 2024-12-17 LAB
AMPHETAMINES UR QL SCN: NORMAL
BARBITURATES UR QL SCN: NORMAL
BZE UR QL SCN: NORMAL
CANNABINOIDS UR QL SCN: NORMAL
CREAT UR-MCNC: 366.6 MG/DL (ref 20–370)
PCP UR QL SCN: NORMAL

## 2024-12-17 PROCEDURE — 80346 BENZODIAZEPINES1-12: CPT

## 2024-12-17 PROCEDURE — 3008F BODY MASS INDEX DOCD: CPT | Performed by: PHYSICAL MEDICINE & REHABILITATION

## 2024-12-17 PROCEDURE — 80361 OPIATES 1 OR MORE: CPT

## 2024-12-17 PROCEDURE — 80368 SEDATIVE HYPNOTICS: CPT

## 2024-12-17 PROCEDURE — 80358 DRUG SCREENING METHADONE: CPT

## 2024-12-17 PROCEDURE — 80307 DRUG TEST PRSMV CHEM ANLYZR: CPT

## 2024-12-17 PROCEDURE — 80354 DRUG SCREENING FENTANYL: CPT

## 2024-12-17 PROCEDURE — 1036F TOBACCO NON-USER: CPT | Performed by: PHYSICAL MEDICINE & REHABILITATION

## 2024-12-17 PROCEDURE — 82570 ASSAY OF URINE CREATININE: CPT

## 2024-12-17 PROCEDURE — 80373 DRUG SCREENING TRAMADOL: CPT

## 2024-12-17 PROCEDURE — 99214 OFFICE O/P EST MOD 30 MIN: CPT | Performed by: PHYSICAL MEDICINE & REHABILITATION

## 2024-12-17 PROCEDURE — 80365 DRUG SCREENING OXYCODONE: CPT

## 2024-12-17 RX ORDER — PREGABALIN 150 MG/1
150 CAPSULE ORAL 3 TIMES DAILY
Qty: 84 CAPSULE | Refills: 1 | Status: SHIPPED | OUTPATIENT
Start: 2024-12-17 | End: 2025-01-14

## 2024-12-17 ASSESSMENT — PATIENT HEALTH QUESTIONNAIRE - PHQ9
7. TROUBLE CONCENTRATING ON THINGS, SUCH AS READING THE NEWSPAPER OR WATCHING TELEVISION: NOT AT ALL
6. FEELING BAD ABOUT YOURSELF - OR THAT YOU ARE A FAILURE OR HAVE LET YOURSELF OR YOUR FAMILY DOWN: NOT AT ALL
2. FEELING DOWN, DEPRESSED OR HOPELESS: MORE THAN HALF THE DAYS
SUM OF ALL RESPONSES TO PHQ QUESTIONS 1-9: 4
5. POOR APPETITE OR OVEREATING: NOT AT ALL
4. FEELING TIRED OR HAVING LITTLE ENERGY: NOT AT ALL
9. THOUGHTS THAT YOU WOULD BE BETTER OFF DEAD, OR OF HURTING YOURSELF: NOT AT ALL
8. MOVING OR SPEAKING SO SLOWLY THAT OTHER PEOPLE COULD HAVE NOTICED. OR THE OPPOSITE, BEING SO FIGETY OR RESTLESS THAT YOU HAVE BEEN MOVING AROUND A LOT MORE THAN USUAL: NOT AT ALL
3. TROUBLE FALLING OR STAYING ASLEEP OR SLEEPING TOO MUCH: NOT AT ALL
10. IF YOU CHECKED OFF ANY PROBLEMS, HOW DIFFICULT HAVE THESE PROBLEMS MADE IT FOR YOU TO DO YOUR WORK, TAKE CARE OF THINGS AT HOME, OR GET ALONG WITH OTHER PEOPLE: SOMEWHAT DIFFICULT
SUM OF ALL RESPONSES TO PHQ9 QUESTIONS 1 AND 2: 4
1. LITTLE INTEREST OR PLEASURE IN DOING THINGS: MORE THAN HALF THE DAYS

## 2024-12-17 ASSESSMENT — ANXIETY QUESTIONNAIRES
4. TROUBLE RELAXING: NOT AT ALL
2. NOT BEING ABLE TO STOP OR CONTROL WORRYING: MORE THAN HALF THE DAYS
IF YOU CHECKED OFF ANY PROBLEMS ON THIS QUESTIONNAIRE, HOW DIFFICULT HAVE THESE PROBLEMS MADE IT FOR YOU TO DO YOUR WORK, TAKE CARE OF THINGS AT HOME, OR GET ALONG WITH OTHER PEOPLE: NOT DIFFICULT AT ALL
3. WORRYING TOO MUCH ABOUT DIFFERENT THINGS: NOT AT ALL
5. BEING SO RESTLESS THAT IT IS HARD TO SIT STILL: NOT AT ALL
7. FEELING AFRAID AS IF SOMETHING AWFUL MIGHT HAPPEN: NOT AT ALL
1. FEELING NERVOUS, ANXIOUS, OR ON EDGE: MORE THAN HALF THE DAYS
6. BECOMING EASILY ANNOYED OR IRRITABLE: NOT AT ALL
GAD7 TOTAL SCORE: 4

## 2024-12-17 ASSESSMENT — ENCOUNTER SYMPTOMS
LOSS OF SENSATION IN FEET: 1
OCCASIONAL FEELINGS OF UNSTEADINESS: 0
DEPRESSION: 1

## 2024-12-17 ASSESSMENT — COLUMBIA-SUICIDE SEVERITY RATING SCALE - C-SSRS
1. IN THE PAST MONTH, HAVE YOU WISHED YOU WERE DEAD OR WISHED YOU COULD GO TO SLEEP AND NOT WAKE UP?: NO
2. HAVE YOU ACTUALLY HAD ANY THOUGHTS OF KILLING YOURSELF?: NO
6. HAVE YOU EVER DONE ANYTHING, STARTED TO DO ANYTHING, OR PREPARED TO DO ANYTHING TO END YOUR LIFE?: NO

## 2024-12-17 ASSESSMENT — PAIN SCALES - GENERAL: PAINLEVEL_OUTOF10: 6

## 2024-12-17 ASSESSMENT — PAIN - FUNCTIONAL ASSESSMENT: PAIN_FUNCTIONAL_ASSESSMENT: 0-10

## 2024-12-17 NOTE — PROGRESS NOTES
Chief complaint  Neck and RUL pain     History  Jared Abbott is back for pain management office visit   Burning pain is getting worse.  In the neck and down the right upper limb.  Of note that he had surgical herniation and he had surgery but failed to respond he developed cervical postlaminectomy syndrome.  The pain in the neck is mechanical the worst pain is around the shoulder blade and down the right upper limb this is a burning pain affecting activities of daily living and ambulation.  It does wake him up at night on intermittent basis.  Denied been bowel or bladder incontinence no lower limb symptoms.  He is taking Nucynta and Lyrica to help with the pain he want to increase Lyrica and cut back Nucynta because Lyrica is helping him most.    Again today, long discussion about putting effort in cutting back on pain medications. Discussed about pain level changing and we do not know if the medications at this amount are still needed until we try and cut back slowly on pain medications. If the cut is tolerated then we continue. If cut not tolerated then, will go back on the pain medications level.  The goal from this is to keep the pain medications at the lowest effective dose.      Pain level without medication is 8/10 , with the medication pain level between 3 and 4/10.     The pain meds are helping control the pain and improving Activities of Daily living and quality of life and quality of sleep.    opioids treatment agreement January 2024  Pill count today, using count tray, and in front of patient :  49 Nucynta and 22 Lyrica     pills , last fill was on 12/2 for 56 pregabalin and   for 112 tabs of nucynta on 11/30,  the count is correct  Oarrs pulled and reviewed, no concerns  last urine toxicology testing earlier this year and it was compliant we will repeat  Xray updated spine  ORT Score is 0  Pain pathology and pain generators spine and radiculopathy  Modalities tried injection, surgery, physical  "therapy, TENS unit, nonsteroidal anti-inflammatory medication cervical fusion.  He does not want to consider injection nor spinal cord stimulator    Review of Systems :  Denied any fever or chills. No weight loss and no night sweats. No cough or sputum production. No diarrhea   The constipation has been responding to fibers and over the counter medications.     No bladder and bowel incontinence and no other changes in bladder and bowel. No skin changes.  Reports tiredness and fatigability only if the pain is not controlled.     Denied opioids diversion and abuse and denies alcoholism. Denies overuse of the pain medications.  No reported euphoria sensation or getting a \"high\" on the pain medications.    The control of the pain with the pain medications is helping the control of the symptoms and allowing the function and activities of daily living, enjoyment of life, improving the quality of life and sleep with less interruption by the pain. The goal is symptomatic control of the nonmalignant chronic pain and not to repair the permanent damage in the tissues inducing the chronic pain conditions. We are aiming to shift the focus from the nonmalignant chronic pain to other aspects of life by symptomatically treating this chronic pain. If this pain is not treated it will lead to major morbidity and it is also associated with increased risks of mortality. The patient understands those very clearly and also understand high risks of morbidity and mortality if not strictly adherent to the treatment recommendations and reporting any associated side effects. Also patient understand the full responsibility associated with these medications to avoid abuse or overuse or any use of these medications for anything besides treating the patient's own chronic pain and nothing else under any circumstances.        Physical examination  Awake, alert and oriented for time place and persons   My nurse  Lemuel FRYE LPN   was present during the " entire history and physical examination    He has positive cervical root tension sign on the right side at 90 degree with the pain radiating to the radial aspect of the right upper limb  No aberrant pain behavior.  Plantar cutaneous are downgoing.  No focal deficits in the lower limbs    Diagnosis  Problem List Items Addressed This Visit       Cervical post-laminectomy syndrome    Relevant Medications    pregabalin (Lyrica) 150 mg capsule    tapentadol (Nucynta) 75 mg tablet (Start on 1/25/2025)    tapentadol (Nucynta) 75 mg tablet (Start on 12/28/2024)    Cervical myofascial pain syndrome    Relevant Medications    pregabalin (Lyrica) 150 mg capsule    tapentadol (Nucynta) 75 mg tablet (Start on 1/25/2025)    tapentadol (Nucynta) 75 mg tablet (Start on 12/28/2024)    Cervical radiculopathy    Relevant Medications    pregabalin (Lyrica) 150 mg capsule    tapentadol (Nucynta) 75 mg tablet (Start on 1/25/2025)    tapentadol (Nucynta) 75 mg tablet (Start on 12/28/2024)    Neurogenic pain    Relevant Medications    pregabalin (Lyrica) 150 mg capsule    tapentadol (Nucynta) 75 mg tablet (Start on 1/25/2025)    tapentadol (Nucynta) 75 mg tablet (Start on 12/28/2024)    Right rotator cuff tendinitis    Relevant Medications    pregabalin (Lyrica) 150 mg capsule    tapentadol (Nucynta) 75 mg tablet (Start on 1/25/2025)    tapentadol (Nucynta) 75 mg tablet (Start on 12/28/2024)    Long term (current) use of opiate analgesic    Relevant Medications    pregabalin (Lyrica) 150 mg capsule    tapentadol (Nucynta) 75 mg tablet (Start on 1/25/2025)    tapentadol (Nucynta) 75 mg tablet (Start on 12/28/2024)     Other Visit Diagnoses       Long term current use of opiate analgesic        Relevant Medications    pregabalin (Lyrica) 150 mg capsule    tapentadol (Nucynta) 75 mg tablet (Start on 1/25/2025)    tapentadol (Nucynta) 75 mg tablet (Start on 12/28/2024)    Encounter for long-term opiate analgesic use        Relevant Medications     pregabalin (Lyrica) 150 mg capsule    tapentadol (Nucynta) 75 mg tablet (Start on 1/25/2025)    tapentadol (Nucynta) 75 mg tablet (Start on 12/28/2024)             Plan  Reviewed the pain generators.  Went over the types of pain with neuropathic and nociceptive and different pathologies and therapeutic modalities. Discussed the mechanism of action of interventions from acupuncture, physical therapy , regular exercises, injections, botox, spinal cord stimulation, and role of surgery     Went over pathology of the intervertebral disc displacement and the anatomical relation to the Nerve roots and relation to the radicular symptoms. Went over treatment modalities with conservative treatment including acupuncture   and epidural steroid injection with fluoroscopy guidance and last resort of surgery    Based on the above findings and the clinical response to the opioids medications and improvement of the activities of daily living, sleep, and work performance. We made this complex decision to continue the opioids therapy in light of the evidence of the patient's responsibility in using the pain medications as prescribed for the nonmalignant chronic pain condition. We discussed about the use of the pain medications to treat the symptoms of chronic nonmalignant pain and we are not trying the repair the permanent damage in the tissues, rather we are trying to control the symptoms induced by the permanent damage to the tissues inducing the chronic pain condition and resulting disability. I explained the difference and discussed it with the patient and stressed the importance of knowing the difference especially because of the potential side effects and the potential addicting effect and habit forming nature of the dangerous drugs we are using to treat the symptoms of the chronic pain.      We discussed that we are prescribing the medications on good stuart and legitimate medical reason.     We reviewed the side effects and  precautions of opioids prescriptions as discussed in the opioids treatment agreement.    realizes the interaction between the therapeutic classes including the respiratory depression and potential death     Random drug testing   we will submit     Cut nucynta to 100 tabs for the month and lyrica increase to TID   realizes the interaction between the therapeutic classes including the respiratory depression and potential death     Discussed about NSAIDS and I explained about the opioids sparing effect to allow keeping the opioids dose at minimal effective dose.   I went over the potential side effects of the NSAIDS on the gastrointestinal, renal and cardiovascular systems.      I detailed the side effects from the acetaminophen in the medication and made aware of those. I also explained about the cumulative effects on the organs and mainly the liver.     Given the opioids therapy , we discussed about the risk for accidental over dose on the pain medications, either for patient or other household. I went over the mechanism of action and mode of use of the Naloxone according to the  recommendations. I will provide a prescription for a kit.     Follow-up 8 weeks or earlier if needed     The level of clinical decision making in this office visit,  is high, given the high risks of complications with the morbidity and mortality due to the fact that acute and chronic pain may pose a threat to life and bodily function, if under treated, poorly treated, or with failure to maintain adequate treatment and timely medical follow up. Additionally over treatment has its own set of complications including overdosing on the pain medications and also the habit forming potentials with the use of the medications used to treat chronic painful conditions including therapeutic classes classified as dangerous medications. Given the serious and fluctuating nature of pain level and instensity with extensive consideration for  whenever pain changes, there is always the risk of prolonged functional impairment requiring close patient monitoring with regular assessments and reassessments and high level medical decision making at every office visit. The amount and complexity of data reviewed is high given the patient clinical presentation, labs,  data, radiology reports, and other tests as discussed during office visits. Pertinent data whether positive or negative were taken in consideration in the process of making this high level medical decision.

## 2024-12-28 ENCOUNTER — PHARMACY VISIT (OUTPATIENT)
Dept: PHARMACY | Facility: CLINIC | Age: 49
End: 2024-12-28
Payer: COMMERCIAL

## 2024-12-28 PROCEDURE — RXMED WILLOW AMBULATORY MEDICATION CHARGE

## 2025-01-25 ENCOUNTER — PHARMACY VISIT (OUTPATIENT)
Dept: PHARMACY | Facility: CLINIC | Age: 50
End: 2025-01-25
Payer: COMMERCIAL

## 2025-01-25 PROCEDURE — RXMED WILLOW AMBULATORY MEDICATION CHARGE

## 2025-02-18 ENCOUNTER — APPOINTMENT (OUTPATIENT)
Dept: PAIN MEDICINE | Facility: CLINIC | Age: 50
End: 2025-02-18
Payer: COMMERCIAL

## 2025-02-18 DIAGNOSIS — Z79.891 LONG TERM (CURRENT) USE OF OPIATE ANALGESIC: ICD-10-CM

## 2025-02-18 DIAGNOSIS — Z79.891 ENCOUNTER FOR LONG-TERM OPIATE ANALGESIC USE: ICD-10-CM

## 2025-02-18 DIAGNOSIS — Z79.891 LONG TERM CURRENT USE OF OPIATE ANALGESIC: ICD-10-CM

## 2025-02-18 DIAGNOSIS — M75.81 RIGHT ROTATOR CUFF TENDINITIS: ICD-10-CM

## 2025-02-18 DIAGNOSIS — M79.2 NEUROGENIC PAIN: ICD-10-CM

## 2025-02-18 DIAGNOSIS — M96.1 CERVICAL POST-LAMINECTOMY SYNDROME: ICD-10-CM

## 2025-02-18 DIAGNOSIS — M54.12 CERVICAL RADICULOPATHY: ICD-10-CM

## 2025-02-18 DIAGNOSIS — M79.18 CERVICAL MYOFASCIAL PAIN SYNDROME: ICD-10-CM

## 2025-02-18 PROCEDURE — 99214 OFFICE O/P EST MOD 30 MIN: CPT | Performed by: PHYSICAL MEDICINE & REHABILITATION

## 2025-02-18 RX ORDER — PREGABALIN 150 MG/1
150 CAPSULE ORAL 3 TIMES DAILY
Qty: 84 CAPSULE | Refills: 1 | Status: SHIPPED | OUTPATIENT
Start: 2025-02-22 | End: 2025-03-22

## 2025-02-18 NOTE — PROGRESS NOTES
Chief complaint  Neck pain with radiation to the right upper limb       History  Jared Abbott is back for pain management office visit  Jared Abbott was at home.  Could not physically come to the office today .  I was at the office.  I used secure audiovisual communication provided by the Epic system. Jared Abbott  agreed on this form of communication and consented to the visit via A/V method.  The patient also understood that this will be billed as office visit.     The pain is in the neck with radiation to the right upper limb lately has been having pain down the left upper limb.  This started after he herniated disc he had surgery and developed cervical postlaminectomy syndrome.  He did not want to consider spinal cord stimulator even though the site of insertion would be above the level of the surgery also the insertion would be posterior well as the surgery was anterior however certainly there is still a risk and I agree with him that he did not want to take risk for that.  The pain is in the neck deep achy stabbing worse with movement it does radiate on the lateral and posterior aspect of the right upper limb with burning and tingling characteristic.    He is trying to cut back on the medication.  The colder weather is not helping.    Long discussion about putting effort in cutting back on pain medications. Discussed about pain level changing and we do not know if the medications at this amount are still needed until we try and cut back slowly on pain medications. If the cut is tolerated then we continue. If cut not tolerated then, will go back on the pain medications level.  The goal from this is to keep the pain medications at the lowest effective dose.    Pain level without medication is 8/10 , with the medication pain level between 2 and 3/10.     Pain disability index improvement by 3-4 points, across different functional categories, with the pain control with the meds.  I went over the  "forms with him during the visit he will fill new forms at the next office visit  The pain meds are helping control the pain and improving Activities of Daily living and quality of life and quality of sleep.    opioids treatment agreement today I reviewed the treatment agreement and sent him copies in digital format to sign    Pill count he is running on schedule we will check on pill count at the next visit  Oarrs pulled and reviewed, no concerns  last urine toxicology testing was compliant this was done on : In November however the lab did not check on tapentadol we will request repeat and we will check on tapentadol  Xray updated spine  ORT Score is 0  Pain pathology and pain generators spine  Modalities tried injection, surgery, physical therapy, TENS unit, nonsteroidal anti-inflammatory medication cervical fusion    Review of Systems :  Denied any fever or chills. No weight loss and no night sweats. No cough or sputum production. No diarrhea   The constipation has been responding to fibers and over the counter medications.     No bladder and bowel incontinence and no other changes in bladder and bowel. No skin changes.  Reports tiredness and fatigability only if the pain is not controlled.     Denied opioids diversion and abuse and denies alcoholism. Denies overuse of the pain medications.  No reported euphoria sensation or getting a \"high\" on the pain medications.    The control of the pain with the pain medications is helping the control of the symptoms and allowing the function and activities of daily living, enjoyment of life, improving the quality of life and sleep with less interruption by the pain. The goal is symptomatic control of the nonmalignant chronic pain and not to repair the permanent damage in the tissues inducing the chronic pain conditions. We are aiming to shift the focus from the nonmalignant chronic pain to other aspects of life by symptomatically treating this chronic pain. If this pain is " not treated it will lead to major morbidity and it is also associated with increased risks of mortality. The patient understands those very clearly and also understand high risks of morbidity and mortality if not strictly adherent to the treatment recommendations and reporting any associated side effects. Also patient understand the full responsibility associated with these medications to avoid abuse or overuse or any use of these medications for anything besides treating the patient's own chronic pain and nothing else under any circumstances.        Physical examination  Awake, alert and oriented for time place and persons     This is a secure A/V visit    Diagnosis  Problem List Items Addressed This Visit       Cervical post-laminectomy syndrome    Relevant Medications    tapentadol (Nucynta) 75 mg tablet (Start on 2/22/2025)    tapentadol (Nucynta) 75 mg tablet (Start on 3/22/2025)    pregabalin (Lyrica) 150 mg capsule (Start on 2/22/2025)    Cervical myofascial pain syndrome    Relevant Medications    tapentadol (Nucynta) 75 mg tablet (Start on 2/22/2025)    tapentadol (Nucynta) 75 mg tablet (Start on 3/22/2025)    pregabalin (Lyrica) 150 mg capsule (Start on 2/22/2025)    Cervical radiculopathy    Relevant Medications    tapentadol (Nucynta) 75 mg tablet (Start on 2/22/2025)    tapentadol (Nucynta) 75 mg tablet (Start on 3/22/2025)    pregabalin (Lyrica) 150 mg capsule (Start on 2/22/2025)    Neurogenic pain    Relevant Medications    tapentadol (Nucynta) 75 mg tablet (Start on 2/22/2025)    tapentadol (Nucynta) 75 mg tablet (Start on 3/22/2025)    pregabalin (Lyrica) 150 mg capsule (Start on 2/22/2025)    Right rotator cuff tendinitis    Relevant Medications    tapentadol (Nucynta) 75 mg tablet (Start on 2/22/2025)    tapentadol (Nucynta) 75 mg tablet (Start on 3/22/2025)    pregabalin (Lyrica) 150 mg capsule (Start on 2/22/2025)    Long term (current) use of opiate analgesic    Relevant Medications     tapentadol (Nucynta) 75 mg tablet (Start on 2/22/2025)    tapentadol (Nucynta) 75 mg tablet (Start on 3/22/2025)    pregabalin (Lyrica) 150 mg capsule (Start on 2/22/2025)     Other Visit Diagnoses       Long term current use of opiate analgesic        Relevant Medications    tapentadol (Nucynta) 75 mg tablet (Start on 2/22/2025)    tapentadol (Nucynta) 75 mg tablet (Start on 3/22/2025)    pregabalin (Lyrica) 150 mg capsule (Start on 2/22/2025)    Encounter for long-term opiate analgesic use        Relevant Medications    tapentadol (Nucynta) 75 mg tablet (Start on 2/22/2025)    tapentadol (Nucynta) 75 mg tablet (Start on 3/22/2025)    pregabalin (Lyrica) 150 mg capsule (Start on 2/22/2025)             Plan  Reviewed the pain generators.  Went over the types of pain with neuropathic and nociceptive and different pathologies and therapeutic modalities. Discussed the mechanism of action of interventions from acupuncture, physical therapy , regular exercises, injections, botox, spinal cord stimulation, and role of surgery     Went over pathology of the intervertebral disc displacement and the anatomical relation to the Nerve roots and relation to the radicular symptoms. Went over treatment modalities with conservative treatment including acupuncture   and epidural steroid injection with fluoroscopy guidance and last resort of surgery    Based on the above findings and the clinical response to the opioids medications and improvement of the activities of daily living, sleep, and work performance. We made this complex decision to continue the opioids therapy in light of the evidence of the patient's responsibility in using the pain medications as prescribed for the nonmalignant chronic pain condition. We discussed about the use of the pain medications to treat the symptoms of chronic nonmalignant pain and we are not trying the repair the permanent damage in the tissues, rather we are trying to control the symptoms induced  by the permanent damage to the tissues inducing the chronic pain condition and resulting disability. I explained the difference and discussed it with the patient and stressed the importance of knowing the difference especially because of the potential side effects and the potential addicting effect and habit forming nature of the dangerous drugs we are using to treat the symptoms of the chronic pain.      We discussed that we are prescribing the medications on good stuart and legitimate medical reason.     We reviewed the side effects and precautions of opioids prescriptions as discussed in the opioids treatment agreement.    realizes the interaction between the therapeutic classes including the respiratory depression and potential death     Random drug testing   we will submit     We will need to repeat urine drug testing with the tapentadol level  We will continue with the medication we will cut back from 112 tablet for the month down to 100 tablets for the month we will continue with Lyrica since this is helping with the burning pain    Discussed about NSAIDS and I explained about the opioids sparing effect to allow keeping the opioids dose at minimal effective dose.   I went over the potential side effects of the NSAIDS on the gastrointestinal, renal and cardiovascular systems.      I detailed the side effects from the acetaminophen in the medication and made aware of those. I also explained about the cumulative effects on the organs and mainly the liver.     Given the opioids therapy , we discussed about the risk for accidental over dose on the pain medications, either for patient or other household. I went over the mechanism of action and mode of use of the Naloxone according to the  recommendations. I will provide a prescription for a kit.     Follow-up 8 weeks or earlier if needed     The level of clinical decision making in this office visit,  is high, given the high risks of complications with  the morbidity and mortality due to the fact that acute and chronic pain may pose a threat to life and bodily function, if under treated, poorly treated, or with failure to maintain adequate treatment and timely medical follow up. Additionally over treatment has its own set of complications including overdosing on the pain medications and also the habit forming potentials with the use of the medications used to treat chronic painful conditions including therapeutic classes classified as dangerous medications. Given the serious and fluctuating nature of pain level and instensity with extensive consideration for whenever pain changes, there is always the risk of prolonged functional impairment requiring close patient monitoring with regular assessments and reassessments and high level medical decision making at every office visit. The amount and complexity of data reviewed is high given the patient clinical presentation, labs,  data, radiology reports, and other tests as discussed during office visits. Pertinent data whether positive or negative were taken in consideration in the process of making this high level medical decision.

## 2025-02-22 ENCOUNTER — PHARMACY VISIT (OUTPATIENT)
Dept: PHARMACY | Facility: CLINIC | Age: 50
End: 2025-02-22
Payer: COMMERCIAL

## 2025-02-22 PROCEDURE — RXMED WILLOW AMBULATORY MEDICATION CHARGE

## 2025-03-22 ENCOUNTER — PHARMACY VISIT (OUTPATIENT)
Dept: PHARMACY | Facility: CLINIC | Age: 50
End: 2025-03-22
Payer: COMMERCIAL

## 2025-03-22 PROCEDURE — RXMED WILLOW AMBULATORY MEDICATION CHARGE

## 2025-04-15 ENCOUNTER — APPOINTMENT (OUTPATIENT)
Dept: PAIN MEDICINE | Facility: CLINIC | Age: 50
End: 2025-04-15
Payer: COMMERCIAL

## 2025-04-15 DIAGNOSIS — Z79.891 LONG TERM (CURRENT) USE OF OPIATE ANALGESIC: ICD-10-CM

## 2025-04-15 DIAGNOSIS — M96.1 CERVICAL POST-LAMINECTOMY SYNDROME: ICD-10-CM

## 2025-04-15 DIAGNOSIS — Z79.891 ENCOUNTER FOR LONG-TERM OPIATE ANALGESIC USE: ICD-10-CM

## 2025-04-15 DIAGNOSIS — M75.81 RIGHT ROTATOR CUFF TENDINITIS: ICD-10-CM

## 2025-04-15 DIAGNOSIS — M79.18 CERVICAL MYOFASCIAL PAIN SYNDROME: ICD-10-CM

## 2025-04-15 DIAGNOSIS — M79.2 NEUROGENIC PAIN: ICD-10-CM

## 2025-04-15 DIAGNOSIS — Z79.891 LONG TERM CURRENT USE OF OPIATE ANALGESIC: ICD-10-CM

## 2025-04-15 DIAGNOSIS — M54.12 CERVICAL RADICULOPATHY: ICD-10-CM

## 2025-04-15 PROCEDURE — 99214 OFFICE O/P EST MOD 30 MIN: CPT | Performed by: PHYSICAL MEDICINE & REHABILITATION

## 2025-04-15 RX ORDER — PREGABALIN 150 MG/1
150 CAPSULE ORAL 3 TIMES DAILY
Qty: 84 CAPSULE | Refills: 1 | Status: SHIPPED | OUTPATIENT
Start: 2025-04-19 | End: 2025-05-17

## 2025-04-15 ASSESSMENT — ENCOUNTER SYMPTOMS
OCCASIONAL FEELINGS OF UNSTEADINESS: 0
LOSS OF SENSATION IN FEET: 1
DEPRESSION: 0

## 2025-04-15 NOTE — PROGRESS NOTES
Chief complaint  Neck and RUL pain   R chest wall pain      Lemuel E LPN,   was present during the entire history and physical examination    History  Jared Abbott is back for pain management office visit  Continues with neck and RUL pain and this is starting from the neck down the right upper limb before that he had herniated disc for which she had discectomy and developed post cervical surgery syndrome.  He did not want to consider spinal cord stimulator did not want to have further injection.  The pain is deep achy in the neck area does radiate to the lateral aspect of the right upper limb.  Lately has been having pain from the knees on the medial aspect to the ankles.  He mentions that this is intermittent no other associated symptoms.  Denied any history of dragging his feet or tripping.  No bowel or bladder incontinence.  He is walking with steady gait.  Lately has been having increase of the pain in the right upper limb.  We have not had any recent MRI to check on the status of the fusion and the herniated disc.  I had the cut back on the Nucynta from 112 for the months to 800 just between 3 and 4 tablets a day.  He feels a difference.  He does take ibuprofen on days that he only have 3 tablets to take that is sometimes affecting his stomach.    Detailed discussion and explanation about the need to try  and cut back on pain medications.  Entertained question about   pain level changing from acute, subacute to chronic pain.  Currently the pain and chronic.  The higher amount of medication were for the acute and subacute phase.  Therefore   we do not know exactly if the medications at this amount are still needed until we try and cut back slowly on pain medications. If the cut is tolerated then we continue trying to cut back further. If cut not tolerated then, will try additional none chemical methods like injection or physical therapy or we can go back on the pain medications level.  The goal from this  is to keep the pain medications at the lowest effective dose and to control the tolerance that develops from the chronic use of opioid therapy.  Our goal is to keep the pain controlled with minimal effective dose.  That will help cutting back on the potential for side effects, and also will help decrease the amount of tolerance developing from the chronic use of opioid medication.    In addition to the above, we discussed about emerging data about tapering opioid therapy for chronic nonmalignant pain.  These are showing increasing evidence of improving the chronic pain itself, anxiety and depression, with the tapering of opioid therapy for chronic nonmalignant pain.  These are additional benefits to the above that we have been discussing.  As long as the cut back is done progressively and safely which we are doing.    Pain level without medication is 7 to 8/10 , with the medication pain level 2 to 3/10.     Pain disability index (PDI) improvement by 2 to 4 points, across different functional categories, with the pain control with the meds. Forms filled by patient are scanned in the chart    The pain meds are helping control the pain and improving Activities of Daily living and quality of life and quality of sleep.    opioids treatment agreement Feb 2025    Pill count today, using count tray, and in front of patient, Nurse and myself :  11    pills , last fill was on 3/22  for 100 tabs,  the meds pill count today is correct. Fill lyrica  at the same time   Oarrs pulled and reviewed, no concerns  last urine toxicology testing was compliant this was done on : dec but did not check on Tapentadol  Xray updated spine but we need to update was cervical MRI we will do that with contrast we will request BUN and creatinine to check and kidney function  ORT (opioid risk tool) score is 0  Pain pathology and pain generators spine  Modalities tried injection, surgery, physical therapy, TENS unit, nonsteroidal anti-inflammatory  "medication fusion did not want to consider spinal cord stimulator    Review of Systems :  Denied any fever or chills. No weight loss and no night sweats. No cough or sputum production. No diarrhea   The constipation has been responding to fibers and over the counter medications.     No bladder and bowel incontinence and no other changes in bladder and bowel. No skin changes.  Reports tiredness and fatigability only if the pain is not controlled.     I further Asked about symptoms or changes affecting the vision, hearing, breathing, digestive system, urinary symptoms, skin, other musculoskeletal condition, neurological conditions these are negative except as detailed in the history and physical examination above    Denied opioids diversion and abuse and denies alcoholism. Denies overuse of the pain medications.  No reported euphoria sensation or getting a \"high\" on the pain medications.    The control of the pain with the pain medications is helping the control of the symptoms and allowing the function and activities of daily living, enjoyment of life, improving the quality of life and sleep with less interruption by the pain. The goal is symptomatic control of the nonmalignant chronic pain and not to repair the permanent damage in the tissues inducing the chronic pain conditions. We are aiming to shift the focus from the nonmalignant chronic pain to other aspects of life by symptomatically treating this chronic pain. If this pain is not treated it will lead to major morbidity and it is also associated with increased risks of mortality. The patient understands those very clearly and also understand high risks of morbidity and mortality if not strictly adherent to the treatment recommendations and reporting any associated side effects. Also patient understand the full responsibility associated with these medications to avoid abuse or overuse or any use of these medications for anything besides treating the patient's " own chronic pain and nothing else under any circumstances.        Physical examination  Awake, alert and oriented for time place and persons   Pupils are equal and reactive to light and accommodation    Walking with slightly widened perimeter of stance no cane no assistive device cervical root tension sign increase the pain down the right upper limb decrease sensory to light touch on the lateral aspect of the right arm  Slight hyperreflexia of knees . plantar cutaneous reflex are down going bilaterally     Diagnosis  Problem List Items Addressed This Visit       Cervical post-laminectomy syndrome    Relevant Medications    tapentadol (Nucynta) 75 mg tablet (Start on 4/19/2025)    tapentadol (Nucynta) 75 mg tablet (Start on 5/17/2025)    pregabalin (Lyrica) 150 mg capsule (Start on 4/19/2025)    Cervical myofascial pain syndrome    Relevant Medications    tapentadol (Nucynta) 75 mg tablet (Start on 4/19/2025)    tapentadol (Nucynta) 75 mg tablet (Start on 5/17/2025)    pregabalin (Lyrica) 150 mg capsule (Start on 4/19/2025)    Cervical radiculopathy    Relevant Medications    tapentadol (Nucynta) 75 mg tablet (Start on 4/19/2025)    tapentadol (Nucynta) 75 mg tablet (Start on 5/17/2025)    pregabalin (Lyrica) 150 mg capsule (Start on 4/19/2025)    Neurogenic pain    Relevant Medications    tapentadol (Nucynta) 75 mg tablet (Start on 4/19/2025)    tapentadol (Nucynta) 75 mg tablet (Start on 5/17/2025)    pregabalin (Lyrica) 150 mg capsule (Start on 4/19/2025)    Right rotator cuff tendinitis    Relevant Medications    tapentadol (Nucynta) 75 mg tablet (Start on 4/19/2025)    tapentadol (Nucynta) 75 mg tablet (Start on 5/17/2025)    pregabalin (Lyrica) 150 mg capsule (Start on 4/19/2025)    Long term (current) use of opiate analgesic    Relevant Medications    tapentadol (Nucynta) 75 mg tablet (Start on 4/19/2025)    tapentadol (Nucynta) 75 mg tablet (Start on 5/17/2025)    pregabalin (Lyrica) 150 mg capsule (Start on  4/19/2025)     Other Visit Diagnoses       Long term current use of opiate analgesic        Relevant Medications    tapentadol (Nucynta) 75 mg tablet (Start on 4/19/2025)    tapentadol (Nucynta) 75 mg tablet (Start on 5/17/2025)    pregabalin (Lyrica) 150 mg capsule (Start on 4/19/2025)    Encounter for long-term opiate analgesic use        Relevant Medications    tapentadol (Nucynta) 75 mg tablet (Start on 4/19/2025)    tapentadol (Nucynta) 75 mg tablet (Start on 5/17/2025)    pregabalin (Lyrica) 150 mg capsule (Start on 4/19/2025)             Plan  Reviewed the pain generators.  Went over the types of pain with neuropathic and nociceptive and different pathologies and therapeutic modalities. Discussed the mechanism of action of interventions from acupuncture, physical therapy , regular exercises, injections, botox, spinal cord stimulation, and role of surgery     Went over pathology of the intervertebral disc displacement and the anatomical relation to the Nerve roots and relation to the radicular symptoms. Went over treatment modalities with conservative treatment including acupuncture   and epidural steroid injection with fluoroscopy guidance and last resort of surgery    Based on the above findings and the clinical response to the opioids medications and improvement of the activities of daily living, sleep, and work performance. We made this complex decision to continue the opioids therapy in light of the evidence of the patient's responsibility in using the pain medications as prescribed for the nonmalignant chronic pain condition. We discussed about the use of the pain medications to treat the symptoms of chronic nonmalignant pain and we are not trying the repair the permanent damage in the tissues, rather we are trying to control the symptoms induced by the permanent damage to the tissues inducing the chronic pain condition and resulting disability. I explained the difference and discussed it with the  patient and stressed the importance of knowing the difference especially because of the potential side effects and the potential addicting effect and habit forming nature of the dangerous drugs we are using to treat the symptoms of the chronic pain.      We discussed that we are prescribing the medications on good stuart and legitimate medical reason within the scope of professional medical practice.     We reviewed the side effects and precautions of opioids prescriptions as discussed in the opioids treatment agreement.    realizes the interaction between the therapeutic classes including the respiratory depression and potential death     Random drug testing   we will submit     I discussed with him that clinically there is no obvious signs of myelopathy however we do not want this to fully develop to image his cervical spine we should proceed with cervical spine advanced imaging with MRI and contrast at this point to rule out any early compression if that is present.  He agreed on that.    Also we will repeat urine drug testing and we will request tapentadol level since the lab did not proceed with those last visit.  Dw him about the pain in the lower limbs and the clinical presentation with some hypereflexia. Will need MRI of the C spine. He has not had an exam lately. Will need MRI with contrast and will check for kidney function.    Discussed about NSAIDS and I explained about the opioids sparing effect to allow keeping the opioids dose at minimal effective dose.   I went over the potential side effects of the NSAIDS on the gastrointestinal, renal and cardiovascular systems.      I detailed the side effects from the acetaminophen in the medication and made aware of those. I also explained about the cumulative effects on the organs and mainly the liver.     Given the opioids therapy , we discussed about the risk for accidental over dose on the pain medications, either for patient or other household. I went over  the mechanism of action and mode of use of the Naloxone according to the  recommendations. I will provide a prescription for a kit.     Follow-up 8 weeks or earlier if needed     The level of clinical decision making in this office visit,  is high, given the high risks of complications with the morbidity and mortality due to the fact that acute and chronic pain may pose a threat to life and bodily function, if under treated, poorly treated, or with failure to maintain adequate treatment and timely medical follow up. Additionally over treatment has its own set of complications including overdosing on the pain medications and also the habit forming potentials with the use of the medications used to treat chronic painful conditions including therapeutic classes classified as dangerous medications. Given the serious and fluctuating nature of pain level and instensity with extensive consideration for whenever pain changes, there is always the risk of prolonged functional impairment requiring close patient monitoring with regular assessments and reassessments and high level medical decision making at every office visit. The amount and complexity of data reviewed is high given the patient clinical presentation, labs,  data, radiology reports, and other tests as discussed during office visits. Pertinent data whether positive or negative were taken in consideration in the process of making this high level medical decision.

## 2025-04-16 LAB
BUN SERPL-MCNC: 9 MG/DL (ref 7–25)
CREAT SERPL-MCNC: 1.03 MG/DL (ref 0.6–1.29)
EGFRCR SERPLBLD CKD-EPI 2021: 89 ML/MIN/1.73M2

## 2025-04-17 LAB
AMPHETAMINES UR QL: NEGATIVE NG/ML
BARBITURATES UR QL: NEGATIVE NG/ML
BENZODIAZ UR QL: NEGATIVE NG/ML
BZE UR QL: NEGATIVE NG/ML
CODEINE UR-MCNC: NEGATIVE NG/ML
CREAT UR-MCNC: 76.6 MG/DL
DRUG SCREEN COMMENT UR-IMP: ABNORMAL
DRUG SCREEN COMMENT UR-IMP: NORMAL
FENTANYL UR QL SCN: NEGATIVE NG/ML
HYDROCODONE UR-MCNC: NEGATIVE NG/ML
HYDROMORPHONE UR-MCNC: NEGATIVE NG/ML
METHADONE UR QL: NEGATIVE NG/ML
MORPHINE UR-MCNC: NEGATIVE NG/ML
NORHYDROCODONE UR CFM-MCNC: NEGATIVE NG/ML
NORTAPENTADOL UR-MCNC: 6092 NG/ML
OPIATES UR QL: NORMAL NG/ML
OXIDANTS UR QL: NEGATIVE MCG/ML
OXYCODONE UR QL: NEGATIVE NG/ML
PCP UR QL: NEGATIVE NG/ML
PH UR: 5.1 [PH] (ref 4.5–9)
QUEST NOTES AND COMMENTS: ABNORMAL
TAPENTADOL UR-MCNC: ABNORMAL NG/ML
THC UR QL: NEGATIVE NG/ML

## 2025-04-19 ENCOUNTER — PHARMACY VISIT (OUTPATIENT)
Dept: PHARMACY | Facility: CLINIC | Age: 50
End: 2025-04-19
Payer: COMMERCIAL

## 2025-04-19 PROCEDURE — RXMED WILLOW AMBULATORY MEDICATION CHARGE

## 2025-05-13 ENCOUNTER — APPOINTMENT (OUTPATIENT)
Dept: RADIOLOGY | Facility: HOSPITAL | Age: 50
End: 2025-05-13
Payer: COMMERCIAL

## 2025-05-16 DIAGNOSIS — Z79.891 ENCOUNTER FOR LONG-TERM OPIATE ANALGESIC USE: ICD-10-CM

## 2025-05-16 DIAGNOSIS — M96.1 CERVICAL POST-LAMINECTOMY SYNDROME: ICD-10-CM

## 2025-05-16 DIAGNOSIS — Z79.891 LONG TERM (CURRENT) USE OF OPIATE ANALGESIC: ICD-10-CM

## 2025-05-16 DIAGNOSIS — M79.18 CERVICAL MYOFASCIAL PAIN SYNDROME: ICD-10-CM

## 2025-05-16 DIAGNOSIS — M54.12 CERVICAL RADICULOPATHY: ICD-10-CM

## 2025-05-16 DIAGNOSIS — M79.2 NEUROGENIC PAIN: ICD-10-CM

## 2025-05-16 DIAGNOSIS — M75.81 RIGHT ROTATOR CUFF TENDINITIS: ICD-10-CM

## 2025-05-16 DIAGNOSIS — Z79.891 LONG TERM CURRENT USE OF OPIATE ANALGESIC: ICD-10-CM

## 2025-05-17 ENCOUNTER — PHARMACY VISIT (OUTPATIENT)
Dept: PHARMACY | Facility: CLINIC | Age: 50
End: 2025-05-17
Payer: COMMERCIAL

## 2025-05-17 PROCEDURE — RXMED WILLOW AMBULATORY MEDICATION CHARGE

## 2025-06-03 ENCOUNTER — APPOINTMENT (OUTPATIENT)
Dept: RADIOLOGY | Facility: HOSPITAL | Age: 50
End: 2025-06-03
Payer: COMMERCIAL

## 2025-06-10 ENCOUNTER — APPOINTMENT (OUTPATIENT)
Dept: PAIN MEDICINE | Facility: CLINIC | Age: 50
End: 2025-06-10
Payer: COMMERCIAL

## 2025-06-10 VITALS
DIASTOLIC BLOOD PRESSURE: 88 MMHG | BODY MASS INDEX: 27.49 KG/M2 | HEIGHT: 70 IN | HEART RATE: 73 BPM | SYSTOLIC BLOOD PRESSURE: 113 MMHG | WEIGHT: 192 LBS | OXYGEN SATURATION: 97 %

## 2025-06-10 DIAGNOSIS — Z79.891 ENCOUNTER FOR LONG-TERM OPIATE ANALGESIC USE: ICD-10-CM

## 2025-06-10 DIAGNOSIS — M79.2 NEUROGENIC PAIN: ICD-10-CM

## 2025-06-10 DIAGNOSIS — M96.1 CERVICAL POST-LAMINECTOMY SYNDROME: Primary | ICD-10-CM

## 2025-06-10 DIAGNOSIS — Z79.891 LONG TERM CURRENT USE OF OPIATE ANALGESIC: ICD-10-CM

## 2025-06-10 DIAGNOSIS — Z79.891 LONG TERM (CURRENT) USE OF OPIATE ANALGESIC: ICD-10-CM

## 2025-06-10 DIAGNOSIS — G82.20 PARAPARESIS (MULTI): ICD-10-CM

## 2025-06-10 DIAGNOSIS — M54.12 CERVICAL RADICULOPATHY: ICD-10-CM

## 2025-06-10 DIAGNOSIS — M79.18 CERVICAL MYOFASCIAL PAIN SYNDROME: ICD-10-CM

## 2025-06-10 DIAGNOSIS — M75.81 RIGHT ROTATOR CUFF TENDINITIS: ICD-10-CM

## 2025-06-10 PROCEDURE — 3008F BODY MASS INDEX DOCD: CPT | Performed by: PHYSICAL MEDICINE & REHABILITATION

## 2025-06-10 PROCEDURE — 99214 OFFICE O/P EST MOD 30 MIN: CPT | Performed by: PHYSICAL MEDICINE & REHABILITATION

## 2025-06-10 RX ORDER — PREGABALIN 150 MG/1
150 CAPSULE ORAL 3 TIMES DAILY
Qty: 84 CAPSULE | Refills: 1 | Status: SHIPPED | OUTPATIENT
Start: 2025-06-14 | End: 2025-07-12

## 2025-06-10 ASSESSMENT — ENCOUNTER SYMPTOMS
OCCASIONAL FEELINGS OF UNSTEADINESS: 0
LOSS OF SENSATION IN FEET: 1
DEPRESSION: 1

## 2025-06-10 NOTE — PROGRESS NOTES
Chief complaint  Neck and RUL pain   Shoulder pain      Lemuel FRYE JEANINE,   was present during the entire history and physical examination    History  Jared Abbott is back for pain management office visit  We requested MRI of spine and he had to reschedule for family issues but that is on the books  Continues to have increased symptoms mainly in the feet and they are numb and now having symtpoms are starting in the hands. Dw him about PN and will need to consider EMG for 1 upper limb and 1 lower limb we will schedule him for the.  I discussed with him about the current pattern is different than he used to have initially the pattern was stemming from the neck to the right upper limb currently his partner that he describes over the last 4 to 5 weeks his symptoms starting at the toes moving up toward mid legs and at that point started to have symptoms at the fingers moving up toward the wrists  We will  measure 1 lower limb and 1 upper limb and see if we can define his pattern of the symptoms from peripheral neuropathy point of view    Pain level without medication is 8/10 , with the medication pain level between 2 and 3/10.     Pain disability index (PDI) improvement   across different functional categories, with the pain control with the meds. Forms filled by patient are scanned in the chart    The pain meds are helping control the pain and improving Activities of Daily living and quality of life and quality of sleep.    opioids treatment agreement January 2025    Pill count today, using count tray, and in front of patient, Nurse and myself :  11    pills , last fill was on 5/17  for 100 tabs,  the meds pill count today is correct (same with lyrica for 84 tabs on 5/17  Oarrs pulled and reviewed, no concerns  last urine toxicology testing was compliant this was done on : April 2025  Xray updated spine  ORT (opioid risk tool) score is 0  Pain pathology and pain generators spine  Modalities tried injection, surgery,  "physical therapy, TENS unit, nonsteroidal anti-inflammatory medication cervical fusion    Review of Systems :  Denied any fever or chills. No weight loss and no night sweats. No cough or sputum production. No diarrhea   The constipation has been responding to fibers and over the counter medications.     No bladder and bowel incontinence and no other changes in bladder and bowel. No skin changes.  Reports tiredness and fatigability only if the pain is not controlled.     I further Asked about symptoms or changes affecting the vision, hearing, breathing, digestive system, urinary symptoms, skin, other musculoskeletal condition, neurological conditions these are negative except as detailed in the history and physical examination above    Denied opioids diversion and abuse and denies alcoholism. Denies overuse of the pain medications.  No reported euphoria sensation or getting a \"high\" on the pain medications.    The control of the pain with the pain medications is helping the control of the symptoms and allowing the function and activities of daily living, enjoyment of life, improving the quality of life and sleep with less interruption by the pain. The goal is symptomatic control of the nonmalignant chronic pain and not to repair the permanent damage in the tissues inducing the chronic pain conditions. We are aiming to shift the focus from the nonmalignant chronic pain to other aspects of life by symptomatically treating this chronic pain. If this pain is not treated it will lead to major morbidity and it is also associated with increased risks of mortality. The patient understands those very clearly and also understand high risks of morbidity and mortality if not strictly adherent to the treatment recommendations and reporting any associated side effects. Also patient understand the full responsibility associated with these medications to avoid abuse or overuse or any use of these medications for anything besides " treating the patient's own chronic pain and nothing else under any circumstances.        Physical examination  Awake, alert and oriented for time place and persons   Pupils are equal and reactive to light and accommodation    Decreased sensory to light touch at the tip of the toes and fingers decreased deep tendon reflexes for the ankle plantar cutaneous are downgoing on both sides  Melinda testing are negative    Diagnosis  Problem List Items Addressed This Visit       Cervical post-laminectomy syndrome - Primary    Relevant Medications    tapentadol (Nucynta) 75 mg tablet (Start on 6/14/2025)    tapentadol (Nucynta) 75 mg tablet (Start on 7/12/2025)    pregabalin (Lyrica) 150 mg capsule (Start on 6/14/2025)    Other Relevant Orders    EMG & nerve conduction    Cervical myofascial pain syndrome    Relevant Medications    tapentadol (Nucynta) 75 mg tablet (Start on 6/14/2025)    tapentadol (Nucynta) 75 mg tablet (Start on 7/12/2025)    pregabalin (Lyrica) 150 mg capsule (Start on 6/14/2025)    Cervical radiculopathy    Relevant Medications    tapentadol (Nucynta) 75 mg tablet (Start on 6/14/2025)    tapentadol (Nucynta) 75 mg tablet (Start on 7/12/2025)    pregabalin (Lyrica) 150 mg capsule (Start on 6/14/2025)    Other Relevant Orders    EMG & nerve conduction    Neurogenic pain    Relevant Medications    tapentadol (Nucynta) 75 mg tablet (Start on 6/14/2025)    tapentadol (Nucynta) 75 mg tablet (Start on 7/12/2025)    pregabalin (Lyrica) 150 mg capsule (Start on 6/14/2025)    Right rotator cuff tendinitis    Relevant Medications    tapentadol (Nucynta) 75 mg tablet (Start on 6/14/2025)    tapentadol (Nucynta) 75 mg tablet (Start on 7/12/2025)    pregabalin (Lyrica) 150 mg capsule (Start on 6/14/2025)    Other Relevant Orders    EMG & nerve conduction    Paraparesis (Multi)    Long term (current) use of opiate analgesic    Relevant Medications    tapentadol (Nucynta) 75 mg tablet (Start on 6/14/2025)    tapentadol  (Nucynta) 75 mg tablet (Start on 7/12/2025)    pregabalin (Lyrica) 150 mg capsule (Start on 6/14/2025)     Other Visit Diagnoses         Long term current use of opiate analgesic        Relevant Medications    tapentadol (Nucynta) 75 mg tablet (Start on 6/14/2025)    tapentadol (Nucynta) 75 mg tablet (Start on 7/12/2025)    pregabalin (Lyrica) 150 mg capsule (Start on 6/14/2025)      Encounter for long-term opiate analgesic use        Relevant Medications    tapentadol (Nucynta) 75 mg tablet (Start on 6/14/2025)    tapentadol (Nucynta) 75 mg tablet (Start on 7/12/2025)    pregabalin (Lyrica) 150 mg capsule (Start on 6/14/2025)             Plan  Reviewed the pain generators.  Went over the types of pain with neuropathic and nociceptive and different pathologies and therapeutic modalities. Discussed the mechanism of action of interventions from acupuncture, physical therapy , regular exercises, injections, botox, spinal cord stimulation, and role of surgery     Went over pathology of the intervertebral disc displacement and the anatomical relation to the Nerve roots and relation to the radicular symptoms. Went over treatment modalities with conservative treatment including acupuncture   and epidural steroid injection with fluoroscopy guidance and last resort of surgery    Based on the above findings and the clinical response to the opioids medications and improvement of the activities of daily living, sleep, and work performance. We made this complex decision to continue the opioids therapy in light of the evidence of the patient's responsibility in using the pain medications as prescribed for the nonmalignant chronic pain condition. We discussed about the use of the pain medications to treat the symptoms of chronic nonmalignant pain and we are not trying the repair the permanent damage in the tissues, rather we are trying to control the symptoms induced by the permanent damage to the tissues inducing the chronic pain  condition and resulting disability. I explained the difference and discussed it with the patient and stressed the importance of knowing the difference especially because of the potential side effects and the potential addicting effect and habit forming nature of the dangerous drugs we are using to treat the symptoms of the chronic pain.      We discussed that we are prescribing the medications on good stuart and legitimate medical reason within the scope of professional medical practice.     We reviewed the side effects and precautions of opioids prescriptions as discussed in the opioids treatment agreement.    realizes the interaction between the therapeutic classes including the respiratory depression and potential death     Random drug testing   we will submit         Discussed about NSAIDS and I explained about the opioids sparing effect to allow keeping the opioids dose at minimal effective dose.   I went over the potential side effects of the NSAIDS on the gastrointestinal, renal and cardiovascular systems.      I detailed the side effects from the acetaminophen in the medication and made aware of those. I also explained about the cumulative effects on the organs and mainly the liver.     Given the opioids therapy , we discussed about the risk for accidental over dose on the pain medications, either for patient or other household. I went over the mechanism of action and mode of use of the Naloxone according to the  recommendations. I will provide a prescription for a kit.     Focus of Today's Visit :  Dw him about the symptoms of the numbness and tingling moving proximally in the feet and now in the hands.  Will schedule for EMG / NCS for one lower and one upper limb  Continue with meds for pain control at this time he does not want to consider epidural steroid injection        Follow-up after the electrodiagnostic study or earlier if needed     The level of clinical decision making in this office  visit,  is high, given the high risks of complications with the morbidity and mortality due to the fact that acute and chronic pain may pose a threat to life and bodily function, if under treated, poorly treated, or with failure to maintain adequate treatment and timely medical follow up. Additionally over treatment has its own set of complications including overdosing on the pain medications and also the habit forming potentials with the use of the medications used to treat chronic painful conditions including therapeutic classes classified as dangerous medications. Given the serious and fluctuating nature of pain level and instensity with extensive consideration for whenever pain changes, there is always the risk of prolonged functional impairment requiring close patient monitoring with regular assessments and reassessments and high level medical decision making at every office visit. The amount and complexity of data reviewed is high given the patient clinical presentation, labs,  data, radiology reports, and other tests as discussed during office visits. Pertinent data whether positive or negative were taken in consideration in the process of making this high level medical decision.

## 2025-06-14 ENCOUNTER — PHARMACY VISIT (OUTPATIENT)
Dept: PHARMACY | Facility: CLINIC | Age: 50
End: 2025-06-14
Payer: COMMERCIAL

## 2025-06-14 PROCEDURE — RXMED WILLOW AMBULATORY MEDICATION CHARGE

## 2025-06-24 ENCOUNTER — APPOINTMENT (OUTPATIENT)
Dept: RADIOLOGY | Facility: HOSPITAL | Age: 50
End: 2025-06-24
Payer: COMMERCIAL

## 2025-06-24 DIAGNOSIS — M54.12 CERVICAL RADICULOPATHY: ICD-10-CM

## 2025-06-24 PROCEDURE — 2550000001 HC RX 255 CONTRASTS: Performed by: PHYSICAL MEDICINE & REHABILITATION

## 2025-06-24 PROCEDURE — 72156 MRI NECK SPINE W/O & W/DYE: CPT | Performed by: RADIOLOGY

## 2025-06-24 PROCEDURE — 72156 MRI NECK SPINE W/O & W/DYE: CPT

## 2025-06-24 PROCEDURE — A9575 INJ GADOTERATE MEGLUMI 0.1ML: HCPCS | Performed by: PHYSICAL MEDICINE & REHABILITATION

## 2025-06-24 RX ORDER — GADOTERATE MEGLUMINE 376.9 MG/ML
18 INJECTION INTRAVENOUS
Status: COMPLETED | OUTPATIENT
Start: 2025-06-24 | End: 2025-06-24

## 2025-06-24 RX ADMIN — GADOTERATE MEGLUMINE 18 ML: 376.9 INJECTION INTRAVENOUS at 17:24

## 2025-07-08 ENCOUNTER — APPOINTMENT (OUTPATIENT)
Dept: PAIN MEDICINE | Facility: CLINIC | Age: 50
End: 2025-07-08
Payer: COMMERCIAL

## 2025-07-08 DIAGNOSIS — M54.12 CERVICAL RADICULOPATHY: ICD-10-CM

## 2025-07-08 DIAGNOSIS — M75.81 RIGHT ROTATOR CUFF TENDINITIS: ICD-10-CM

## 2025-07-08 DIAGNOSIS — Z79.891 LONG TERM (CURRENT) USE OF OPIATE ANALGESIC: ICD-10-CM

## 2025-07-08 DIAGNOSIS — M96.1 CERVICAL POST-LAMINECTOMY SYNDROME: ICD-10-CM

## 2025-07-08 DIAGNOSIS — M79.18 CERVICAL MYOFASCIAL PAIN SYNDROME: ICD-10-CM

## 2025-07-08 DIAGNOSIS — Z79.891 ENCOUNTER FOR LONG-TERM OPIATE ANALGESIC USE: ICD-10-CM

## 2025-07-08 DIAGNOSIS — Z79.891 LONG TERM CURRENT USE OF OPIATE ANALGESIC: ICD-10-CM

## 2025-07-08 DIAGNOSIS — M79.2 NEUROGENIC PAIN: ICD-10-CM

## 2025-07-08 PROCEDURE — 95911 NRV CNDJ TEST 9-10 STUDIES: CPT | Performed by: PHYSICAL MEDICINE & REHABILITATION

## 2025-07-08 PROCEDURE — 95886 MUSC TEST DONE W/N TEST COMP: CPT | Performed by: PHYSICAL MEDICINE & REHABILITATION

## 2025-07-08 ASSESSMENT — ENCOUNTER SYMPTOMS
OCCASIONAL FEELINGS OF UNSTEADINESS: 0
DEPRESSION: 0
LOSS OF SENSATION IN FEET: 0

## 2025-07-08 NOTE — PROGRESS NOTES
[facility]  [address], [address]  [place] [address]    [facility]  [address], [address]  [place] [address]  Harshad Vaz MD   Spine and Pain Medicine    Phone: [phone]  Fax: [phone]       Electrodiagnostic Study Report          Patient: Jared Abbott Weight: 192 lbs  Sex: Male YOB: 1975  Height: 5 feet 9 inch Age: 50 Years 4 Months  Skin temp UL 33 deg C and LL 32 deg C    Clinical History:   Mr Abbott is back for the nerve study of the RUL and RLL         Sensory NCS      Nerve / Sites Rec. Site Onset Peak NP Amp Dist Rowdy     ms ms µV cm m/s   R MEDIAN - Dig III      Wrist III 2.50 4.15 18.0 14 56.0      Ref.   3.70 20.0        Palm III 1.25 2.10 4.6 7 56.0      Ref.   2.30      R ULNAR - Dig V      Wrist Dig V 2.00 2.85 19.9 14 70.0      Ref.   3.70 10.0     R RADIAL - Thumb      Forearm Thumb 1.70 2.45 16.3 10 58.8      Ref.   3.00 8.0     R SURAL - Lat Mall      Calf Lat Mall 2.75 3.65 5.8 14 50.9      Ref.   4.50 5.0     R SUP PERONEAL      Lat Leg Ankle 3.55 4.35 3.7 14 39.4      Ref.   4.50      R LATERAL PLANTAR      Sole Med Mall 3.75 4.50 2.9     R MEDIAL PLANTAR      Sole Med Mall 3.95 4.60 3.4         Motor NCS      Nerve / Sites Rec. Site Lat Amp Rel Amp Dist Rowdy     ms mV % cm m/s   R MEDIAN - APB      Wrist APB 4.35 7.2 100 8       Ref.  4.40 4.0         Elbow APB 8.60 7.1 98.4 24 56.5      Ref.      49.0   R ULNAR - ADM      Wrist ADM 2.80 7.3 100 8       Ref.  4.40 5.0         B.Elbow ADM 6.55 9.2 125 21.5 57.3      Ref.      49.0      A.Elbow ADM 8.30 9.6 130 11 62.9      Ref.      49.0   R COMM PERONEAL - EDB      Ankle EDB 6.00 3.5 100 8       Ref.  6.00 2.0         FibHead EDB 13.70 2.9 82.8 31 40.3      Ref.      39.0      Knee EDB 16.00 2.4 68.6 10 43.5      Ref.      39.0   R TIBIAL (KNEE) - AH      Ankle AH 5.60 3.2 100 8       Ref.  6.00 2.0         Knee AH 14.90 2.8 87.5 37 39.8       Ref.      39.0       F  Wave      Nerve Fmin    ms   R MEDIAN 27.05   R ULNAR 26.50   R Common Peronal 42.85   R TIBIAL 43.05       Needle EMG   Needle EMG testing of the right  lower limb including right  lumbar  paraspinals, Biceps femoris, vastus medialis and lateralis, tibialis anterior and posterior, EHL, Peroneus longus and medial head of the Gastrocnemius showed normal insertional activity, and normal recruitment. No abnormal spontaneous activity seen on needle testing. The motor unit potentials are within normal limits with no signs of denervation of membrane instability      Needle EMG testing of the right  upper limb including the right   cervical paraspinals  Deltoid, Triceps, Extensor digitorum communis, flexor digitorum profundus, flexor carpi ulnaris,  pronator teres, biceps, triceps brachioradialis and Abductor pollicis brevis, First dorsal interosseous abductor digiti minimi (ADM) showed normal insertional activity, steady baseline and normal recruitment. No abnormal spontaneous activity noted on examination. The motor unit potentials are within normal limits except for   large motor unit potentials in the right lower cervical paraspinals, brachioradialis, pronator and triceps.      Summary:  Nerve conduction study of the right median sensory nerve showed mild delay in the distal sensory nerve conduction latency and mild decreased amplitude.  Nerve conduction study of the right median motor nerve is within normal limits  Normal nerve conduction study of the right Ulnar motor conduction  Normal nerve conduction study of the right ulnar and radial sensory nerves   Needle EMG is compatible with old motor radiculopathy for the right C5, C6 and C7 distribution  Normal latency of the waves of the right upper limb    Normal nerve conduction study of the tibial and common peroneal motor nerves  Normal nerve conduction study of the right sural and superficial peroneal sensory nerve and for the right plantar  nerves  Normal F waves for the motor nerves of the right lower limbs  Needle EMG of the right lower limbs is within normal limits         Conclusion:  These findings are compatible with mild right focal median neuropathy at the right wrist compatible with mild carpal tunnel syndrome   There are 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     Lemuel FRYE LPN was present during the entire visit    Thank you for your referral and for giving us the opportunity to participate in the care of your patient.    Sincerely,       Harshad Vaz M.D.

## 2025-07-10 PROCEDURE — RXMED WILLOW AMBULATORY MEDICATION CHARGE

## 2025-07-12 ENCOUNTER — PHARMACY VISIT (OUTPATIENT)
Dept: PHARMACY | Facility: CLINIC | Age: 50
End: 2025-07-12
Payer: COMMERCIAL

## 2025-07-12 PROCEDURE — RXMED WILLOW AMBULATORY MEDICATION CHARGE

## 2025-07-25 ENCOUNTER — APPOINTMENT (OUTPATIENT)
Dept: RADIOLOGY | Facility: CLINIC | Age: 50
End: 2025-07-25
Payer: COMMERCIAL

## 2025-07-25 ENCOUNTER — OFFICE VISIT (OUTPATIENT)
Dept: ORTHOPEDIC SURGERY | Facility: CLINIC | Age: 50
End: 2025-07-25
Payer: COMMERCIAL

## 2025-07-25 DIAGNOSIS — M54.16 LUMBAR RADICULOPATHY: ICD-10-CM

## 2025-07-25 DIAGNOSIS — G89.29 NECK PAIN, CHRONIC: ICD-10-CM

## 2025-07-25 DIAGNOSIS — M54.2 NECK PAIN, CHRONIC: ICD-10-CM

## 2025-07-25 DIAGNOSIS — M54.12 CERVICAL RADICULOPATHY: ICD-10-CM

## 2025-07-25 PROCEDURE — 1036F TOBACCO NON-USER: CPT | Performed by: STUDENT IN AN ORGANIZED HEALTH CARE EDUCATION/TRAINING PROGRAM

## 2025-07-25 PROCEDURE — 99202 OFFICE O/P NEW SF 15 MIN: CPT | Performed by: STUDENT IN AN ORGANIZED HEALTH CARE EDUCATION/TRAINING PROGRAM

## 2025-07-25 PROCEDURE — 99205 OFFICE O/P NEW HI 60 MIN: CPT | Performed by: STUDENT IN AN ORGANIZED HEALTH CARE EDUCATION/TRAINING PROGRAM

## 2025-07-25 PROCEDURE — 72050 X-RAY EXAM NECK SPINE 4/5VWS: CPT

## 2025-07-25 ASSESSMENT — PAIN - FUNCTIONAL ASSESSMENT: PAIN_FUNCTIONAL_ASSESSMENT: 0-10

## 2025-07-25 ASSESSMENT — PAIN SCALES - GENERAL: PAINLEVEL_OUTOF10: 8

## 2025-07-25 NOTE — PROGRESS NOTES
Orthopaedic Spine Surgery Clinic Note    Patient ID: Jared Abbott is a 50 y.o. male.    Chief Complaint  Chief Complaint   Patient presents with    Neck - Pain       History of Present Illness  Jared Abbott is a pleasant 50-year-old male who presents with his wife for evaluation of bilateral feet numbness and pain.     Separately, he experiences right greater than left hand paresthesias and burning pain, balance issues, and issues with dexterity since a cervical disc herniation in 2016.  He underwent a a C5-C7 ACDF by Dr. Armas at [facility] for right arm radiculopathy symptoms and cervical spinal cord compression.    Prior treatments:  Lyrica 150 mg   Nucynta 75 mg    Relevant PMH/PSH for spine  ***    Medical History[1]    Surgical History[2]    Social History[3]    Family History[4]    Allergies[5]    Current Outpatient Medications   Medication Instructions    naloxone (Narcan) 4 mg/0.1 mL nasal spray nasal,  spray one bottle into nostril while patient lay on there back , repeat if needed      Nucynta 75 mg, oral, 4 times daily PRN, 100 tabs for 28 days    Nucynta 75 mg, oral, 4 times daily PRN, 100 tabs for 28 days.    pregabalin (LYRICA) 150 mg, oral, 3 times daily         Ortho Exam  General: AO x 3, NAD  Cardio: examined extremities perfused by peripheral palpation  Resp: breathing unlabored  Gait: within normal limits, non-antalgic  Tenderness: no tenderness to palpation  ROM: No pain with forward flexion, extension, side bending, or rotation of neck    Lower Extremity  Right  Left  Iliopsoas  5/5  5/5  Quadriceps  5/5  5/5  Tibialis anterior  5/5  5/5  EHL   5/5  5/5  Gastrocnemius  5/5  5/5    Sensation: Normal to light touch throughout lower extremities bilaterally.    Reflexes:  Right   Left  Q  2+  2+  A   2+   2+    Clonus: negative  Babinski: WNL  Straight leg raise: negative    Hip:  Negative log roll  Negative BRANDON/FADIR  No trochanteric tenderness to palpation      Diagnostic Results -  Imaging  No image results found.        Diagnosis  Encounter Diagnosis   Name Primary?    Cervical radiculopathy           Assessment/Plan  ***    F/U ***    Patito Amado PA-C  Orthopedic Spine Surgery       [1]   Past Medical History:  Diagnosis Date    Acquired absence of spleen 02/01/2016    Other asplenic status    Cervical disc disorder 06/2016    Chronic pain disorder 06/15/2016    Extremity pain 06/2016    Headache 06/2016    Hereditary spherocytosis 02/01/2016    Spherocytosis    Joint pain 06/2016    Neck pain 06/15/2016    Peripheral neuropathy 06/2016    Spinal stenosis 06/2016   [2]   Past Surgical History:  Procedure Laterality Date    EPIDURAL BLOCK INJECTION  2020    NECK SURGERY  06/2016    OTHER SURGICAL HISTORY  06/29/2017    Arthrodesis Below C2 Anterior Interbody Including Osteophytectomy & Decompress    SPLENECTOMY, TOTAL  02/01/2016    Splenectomy    TRIGGER POINT INJECTION  2016   [3]   Social History  Socioeconomic History    Marital status:    Tobacco Use    Smoking status: Never    Smokeless tobacco: Never   Substance and Sexual Activity    Alcohol use: Never     Alcohol/week: 2.0 standard drinks of alcohol     Types: 2 Cans of beer per week    Drug use: Never    Sexual activity: Defer     Partners: Female     Birth control/protection: Condom Male     Comment: Before my neck injury i used condoms when wife wasn’t on bir   [4]   Family History  Problem Relation Name Age of Onset    Asthma Mother      Cerebral aneurysm Mother      Stroke Mother      Other (spine disorder) Mother      Dementia Father Jared Abbott     Hypertension Father Jared Abbott     Migraines Father Jared Abbott     Heart attack Father Jared Abbott     Alzheimer's disease Father Jaredfiona Abbott     Migraines Sister Mallory winkler     Other (systemic lupus erythematosus) Sister Mallory chamberss     Migraines Daughter Brittni Abbott     Multiple sclerosis Other     [5] No Known Allergies

## 2025-07-25 NOTE — PROGRESS NOTES
Orthopaedic Spine Surgery Clinic Note    Patient ID: Jared Abbott is a 50 y.o. male.    Chief Complaint  Chief Complaint   Patient presents with    Neck - Pain     Referral from Dr. Vaz    History of Present Illness  Jared Abbott is a pleasant 50-year-old male who presents with his wife for evaluation of numbness and pain in the bilateral feet as well as right-sided mechanical neck and right upper extremity pain.  Currently, patient's most bothersome complaint is sharp pains along with significant numbness in the dorsal and plantar aspects of his feet which has been progressively worsening over the past year. He admits to finding a tac stuck in the bottom of his foot over this time frame that he could not feel due to the numbness. He denies low back pain or numbness and tingling in the legs.     Separately, he experiences neck pain radiating down the right arm with right hand numbness.  He has been experiencing the symptoms since a cervical disc herniation in 2016 for which he had underwent a a C5-C7 ACDF by Dr. Armas at [facility] (2016).  He was also experiencing balance difficulties and issues with dexterity due to cervical spinal cord compression prior to the procedure.  After the procedure, his neck and arm pain improved, but he is still dealing with longstanding balance and dexterity issues as well as numbness in the right hand and forearm.  He was referred by Dr. Vaz for discussion of potential surgical treatment options for the cervical spine.  He is a longstanding patient of Dr. Vaz's.       Prior treatments:  Lyrica 150 mg TID  Nucynta 75 mg QID PRN  Prior cervical HIEN last on 2/11/2019 (Dr. Vaz)  EMG/NCS recently on 7/8/2025      Relevant PMH/PSH for spine  C5-C7 ACDF 2016 (Dr. Armas)    Medical History[1]    Surgical History[2]    Social History[3]    Family History[4]    Allergies[5]    Current Outpatient Medications   Medication Instructions    naloxone (Narcan) 4  mg/0.1 mL nasal spray nasal,  spray one bottle into nostril while patient lay on there back , repeat if needed      Nucynta 75 mg, oral, 4 times daily PRN, 100 tabs for 28 days    Nucynta 75 mg, oral, 4 times daily PRN, 100 tabs for 28 days.    pregabalin (LYRICA) 150 mg, oral, 3 times daily         Ortho Exam  General: AO x 3, NAD  Cardio: examined extremities perfused by peripheral palpation  Resp: breathing unlabored  Gait: within normal limits, non-antalgic  Tenderness: no tenderness to palpation  ROM: pain with forward flexion, extension, side bending, or rotation of neck    Upper Extremity  Right  Left  Deltoid   5/5  5/5  Biceps   5/5  5/5  Triceps   5/5  5/5  Wrist extension  5/5  5/5     5/5  5/5  Intrinsics  5/5  5/5      Lower Extremity  Right  Left  IP   5/5  5/5  Quadriceps  5/5  5/5  Tibialis anterior  5/5  5/5  EHL   5/5  5/5  Gastrocnemius  5/5  5/5    Sensation: Normal to light touch throughout upper and lower extremities bilaterally.    Reflexes:  Right   Left  Bi  2+   2+  Tri  2+   2+  BR  2+  2+  Q  2+  2+  A   2+   2+    Clonus: negative    Diagnostic Results - Imaging    XR cervical spine today, 7/25/2025  Official read pending.  New upright AP, lateral, flexion, and extension radiographs of the cervical spine were obtained in the office today.  These images are of good quality.  Demonstrated on these views is a lordotic cervical spine. There are changes consistent with a C5-7 anterior cervical discectomy and fusion with PEEK interbody cages.  There appears to be a solid osseous fusion at C5-6, however there is questionable fusion status at C6-7.  This segment is not clearly visualized, although interspinous motion between flexion and extension views are approximately 1 mm and difference.  Prevertebral soft tissues appear normal.        MRI cervical spine 6/24/2025:  FINDINGS:      The study is mildly degraded by motion.          Postoperative changes are again identified compatible with a  previous  posterior discectomies at the C5/6 and C6/7 levels. There is again  evidence of metallic artifact from anterior orthopedic fixation  hardware and screws extending from the C5 through C7 levels.      There are mild degenerative signal changes noted along endplates  within the cervical region.      The visualized spinal cord demonstrates no signal abnormality or  abnormal enhancement within it.      At the C2/3 level,  there is a mild posterior disc/osteophyte complex  and ligamentum flavum hypertrophy without significant spinal canal  narrowing or spinal cord deformity. There is no significant neural  foraminal narrowing.      At the C3/4 level,  there is a posterior disc/osteophyte complex and  ligamentum flavum hypertrophy contributing to effacement of the  dorsal subarachnoid space and partial effacement of ventral  subarachnoid space without significant spinal cord deformity.  Degenerative uncovertebral joint changes and degenerative facet  changes contributing to moderate left and mild right-sided neural  foraminal narrowing.      At the C4/5 level,  there is a mild posterior disc/osteophyte complex  and ligamentum flavum hypertrophy contributing to mild impression  upon the ventral and dorsal subarachnoid space without significant  spinal cord deformity. There are degenerative facet changes and mild  degenerative uncovertebral joint changes contributing 2 mild  encroachment upon the neural foramen left greater than right.      At the C5/6 level,  there are postoperative changes compatible with a  previous discectomy and fusion. There is mild posterior bony  hypertrophy contributing to mild impression upon the ventral  subarachnoid space without significant spinal canal narrowing or  spinal cord deformity. There is no significant neural foraminal  narrowing.      At the C6/7 level,  there are postoperative changes compatible with a  previous discectomy and fusion. There is a mild posterior  bony  hypertrophy contributing to mild impression upon the ventral  subarachnoid space without significant spinal canal narrowing or  spinal cord deformity. There is mild encroachment upon the neural  foramen bilaterally.      At the C7/T1 level,  there is a mild posterior disc/osteophyte  complex and degenerative facet changes contributing to mild spinal  canal narrowing without spinal cord deformity. There is  mild-to-moderate left-sided neural foraminal narrowing. There is no  significant right-sided neural foraminal narrowing.      At the T1/2 level,  the sagittal images demonstrate a mild posterior  disc/osteophyte complex contributing to mild impression upon the  ventral subarachnoid space without significant spinal canal  narrowing. There are degenerative facet changes contributing to mild  encroachment upon the neural foramen bilaterally. No axial images  were obtained through this level limiting further evaluation.      IMPRESSION:  Postoperative changes are again identified compatible with a previous  posterior discectomies at the C5/6 and C6/7 levels. There is again  evidence of metallic artifact from anterior orthopedic fixation  hardware and screws extending from the C5 through C7 levels.      There is multilevel spondylosis with varying degrees of spinal canal  and neural foraminal narrowing as described above.      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    Cervical radiculopathy     Lumbar radiculopathy     Neck pain, chronic           Assessment/Plan  Jared Abbott is a pleasant 50-year-old male who presents with his wife for evaluation of several complaints.  His primary symptom today in the office is diffuse numbness over bilateral feet both on the  dorsal/plantar aspects along with burning pain that worsens with activity and ambulation.  Patient separately relates chronic right-sided mechanical neck pain with radiation into the right upper extremity with numbness and paresthesias alongside right-sided rib discomfort with chronic balance/incoordination and dexterity issues.  Patient has been under the long-term care of Dr. Vaz.  He is currently being managed on oral opioid medication, Nucynta.  He did previously undergo cervical HIEN's most recently in 2019 and did undergo an EMG study a couple weeks ago.    I had an extensive discussion in the office today with the patient with regards to his symptoms, his imaging findings, and possible management options.  We first reviewed his XR and MRI evaluation of the cervical spine.  We again reviewed his C5-7 ACDF.  On XR view there does appear to be a solid osseous fusion at C5-6, however the fusion status is C6-7 is more questionable.  I cannot fully appreciate this segment on the current views.  There is suggestion of fusion given the minimal interspinous motion on dynamic views, however we did discuss evaluating this in more detail with a CT of the cervical spine which can more thoroughly evaluate for fusion status.  If there is suggestion of pseudoarthrosis, this may explain some element of persistent mechanical neck pain that he has.  We did discuss how some of his chronic radiculopathic symptoms may relate to his pre-existing spinal cord and nerve compression.  We did discuss how the nerve damage chronically can persist with symptoms despite appropriate decompression.  On review of his MRI today I did not appreciate any severe focus of spinal cord or nerve compression.  He does have a left-sided neuroforaminal stenosis at C3-4, however this does not correlate with his symptoms.    With regards to his lower extremity discomforts, I did explain that this may stem from his lower back.  He has not had any formal  treatment to his low back recently although did complete physical therapy in the remote past shortly after his cervical surgery.  He does have rather dense numbness throughout his feet that has been a chronic consideration over the past year.  This is affected his quality of life and ADLs.  I did recommend moving forward with an MRI of the lumbar spine to more thoroughly evaluate for any focus of nerve compression that may be contributing to his symptoms.  We also placed an order for a hemoglobin A1c to be drawn to evaluate for any diabetic considerations that may be contributing to a neuropathic picture in him.  He was instructed on how to schedule these imaging and lab testings.    Patient will follow-up with us in approximately 3 to 4 weeks for repeat clinical evaluation and review of his CT, MRI, and blood work results.    F/U 3-4 weeks, CT and MRI review    Patito Amado PA-C  Orthopedic Spine Surgery    Orders Placed This Encounter   Procedures    XR cervical spine complete 4-5 views    MR lumbar spine wo IV contrast    CT cervical spine wo IV contrast    Hemoglobin A1C          I reviewed the PA's documentation and discussed the patient with the PA. I agree with the PA's medical decision making as documented in the note.     --    Hesham Ordoñez MD  Orthopaedic Spine Surgery  , Department of Orthopaedic Surgery  [facility]               [1]   Past Medical History:  Diagnosis Date    Acquired absence of spleen 02/01/2016    Other asplenic status    Cervical disc disorder 06/2016    Chronic pain disorder 06/15/2016    Extremity pain 06/2016    Headache 06/2016    Hereditary spherocytosis 02/01/2016    Spherocytosis    Joint pain 06/2016    Neck pain 06/15/2016    Peripheral neuropathy 06/2016    Spinal stenosis 06/2016   [2]   Past Surgical History:  Procedure Laterality Date    EPIDURAL BLOCK INJECTION  2020    NECK SURGERY  06/2016    OTHER SURGICAL  HISTORY  06/29/2017    Arthrodesis Below C2 Anterior Interbody Including Osteophytectomy & Decompress    SPLENECTOMY, TOTAL  02/01/2016    Splenectomy    TRIGGER POINT INJECTION  2016   [3]   Social History  Socioeconomic History    Marital status:    Tobacco Use    Smoking status: Never    Smokeless tobacco: Never   Substance and Sexual Activity    Alcohol use: Never     Alcohol/week: 2.0 standard drinks of alcohol     Types: 2 Cans of beer per week    Drug use: Never    Sexual activity: Defer     Partners: Female     Birth control/protection: Condom Male     Comment: Before my neck injury i used condoms when wife wasn’t on bir   [4]   Family History  Problem Relation Name Age of Onset    Asthma Mother      Cerebral aneurysm Mother      Stroke Mother      Other (spine disorder) Mother      Dementia Father Jared bAbott     Hypertension Father Jared Abbott     Migraines Father Jared Abbott     Heart attack Father Jared Abbott     Alzheimer's disease Father Jared Abbott     Migraines Sister Mallory winkler     Other (systemic lupus erythematosus) Sister Mallory winkler     Migraines Daughter Brittni Abbott     Multiple sclerosis Other     [5] No Known Allergies

## 2025-07-26 LAB
EST. AVERAGE GLUCOSE BLD GHB EST-MCNC: 85 MG/DL
EST. AVERAGE GLUCOSE BLD GHB EST-SCNC: 4.7 MMOL/L
HBA1C MFR BLD: 4.6 %

## 2025-08-05 ENCOUNTER — APPOINTMENT (OUTPATIENT)
Dept: PAIN MEDICINE | Facility: CLINIC | Age: 50
End: 2025-08-05
Payer: COMMERCIAL

## 2025-08-05 DIAGNOSIS — M75.81 RIGHT ROTATOR CUFF TENDINITIS: ICD-10-CM

## 2025-08-05 DIAGNOSIS — M79.2 NEUROGENIC PAIN: ICD-10-CM

## 2025-08-05 DIAGNOSIS — M54.12 CERVICAL RADICULOPATHY: ICD-10-CM

## 2025-08-05 DIAGNOSIS — M79.18 CERVICAL MYOFASCIAL PAIN SYNDROME: ICD-10-CM

## 2025-08-05 DIAGNOSIS — Z79.891 LONG TERM (CURRENT) USE OF OPIATE ANALGESIC: ICD-10-CM

## 2025-08-05 DIAGNOSIS — M96.1 CERVICAL POST-LAMINECTOMY SYNDROME: ICD-10-CM

## 2025-08-05 DIAGNOSIS — Z79.891 ENCOUNTER FOR LONG-TERM OPIATE ANALGESIC USE: ICD-10-CM

## 2025-08-05 DIAGNOSIS — Z79.891 LONG TERM CURRENT USE OF OPIATE ANALGESIC: ICD-10-CM

## 2025-08-05 PROCEDURE — 1036F TOBACCO NON-USER: CPT | Performed by: PHYSICAL MEDICINE & REHABILITATION

## 2025-08-05 PROCEDURE — 99214 OFFICE O/P EST MOD 30 MIN: CPT | Performed by: PHYSICAL MEDICINE & REHABILITATION

## 2025-08-05 RX ORDER — PREGABALIN 150 MG/1
150 CAPSULE ORAL 3 TIMES DAILY
Qty: 84 CAPSULE | Refills: 1 | Status: CANCELLED | OUTPATIENT
Start: 2025-08-05 | End: 2025-09-02

## 2025-08-05 RX ORDER — PREGABALIN 150 MG/1
150 CAPSULE ORAL 3 TIMES DAILY
Qty: 84 CAPSULE | Refills: 1 | Status: SHIPPED | OUTPATIENT
Start: 2025-08-05 | End: 2025-09-06

## 2025-08-05 NOTE — PROGRESS NOTES
Chief complaint  Neck and RUL pain      Lemuel E LPN,   was present during the entire history and physical examination    History  Jared Abbott is back for pain management office visit  Saw the surgeon and going to have CT scan on the fusion to make about the fusion taking  Going to have the L spine MRI for the numbness in the feet.  We did the EMG and did not have neuropathy, will wait on the MRI of the L spine. Otherwise will need to consider peripheral neuropathy work up  \however the pain and burning and has been these since the myelopathy but it is slowly getting worse   His complaint is the throbbing in the feet that is getting worse lately    Trying to cut back on the pain meds , I explained the new coming out about tapering opioid therapy for chronic nonmalignant pain.  These are showing increasing evidence of improving the chronic pain itself, anxiety and depression, with the tapering of opioid therapy for chronic nonmalignant pain.  These are additional benefits to the above that we have been discussing.  As long as the cut back is done progressively and safely which we are doing, and avoid any abrupt interruption of the therapy.     Pain level without medication is 8/10 , with the medication pain level 4/10.     Pain disability index (PDI) improvement   across different functional categories, with the pain control with the meds. Forms filled by patient are scanned in the chart    The pain meds are helping control the pain and improving Activities of Daily living and quality of life and quality of sleep.    opioids treatment agreement Jan 2025    Pill count today, using count tray, and in front of patient, Nurse and myself :  12    pills , last fill was on 7/12  for 100 tabs,  the meds pill count today is correct and filled lyrica 150 mg , 84 tabs on 7/10  Oarrs pulled and reviewed, no concerns  last urine toxicology testing was compliant this was done on : April 2025  Xray updated spine   ORT  "(opioid risk tool) score is  0  Pain pathology and pain generators spine   Modalities tried injection, surgery, physical therapy, TENS unit, nonsteroidal anti-inflammatory medication       Review of Systems :  Denied any fever or chills. No weight loss and no night sweats. No cough or sputum production. No diarrhea   The constipation has been responding to fibers and over the counter medications.     No bladder and bowel incontinence and no other changes in bladder and bowel. No skin changes.  Reports tiredness and fatigability only if the pain is not controlled.     I further Asked about symptoms or changes affecting the vision, hearing, breathing, digestive system, urinary symptoms, skin, other musculoskeletal condition, neurological conditions these are negative except as detailed in the history and physical examination above    Denied opioids diversion and abuse and denies alcoholism. Denies overuse of the pain medications.  No reported euphoria sensation or getting a \"high\" on the pain medications.    The control of the pain with the pain medications is helping the control of the symptoms and allowing the function and activities of daily living, enjoyment of life, improving the quality of life and sleep with less interruption by the pain. The goal is symptomatic control of the nonmalignant chronic pain and not to repair the permanent damage in the tissues inducing the chronic pain conditions. We are aiming to shift the focus from the nonmalignant chronic pain to other aspects of life by symptomatically treating this chronic pain. If this pain is not treated it will lead to major morbidity and it is also associated with increased risks of mortality. The patient understands those very clearly and also understand high risks of morbidity and mortality if not strictly adherent to the treatment recommendations and reporting any associated side effects. Also patient understand the full responsibility associated with " these medications to avoid abuse or overuse or any use of these medications for anything besides treating the patient's own chronic pain and nothing else under any circumstances.        Physical examination  Awake, alert and oriented for time place and persons   Pupils are equal and reactive to light and accommodation    SLR negatie   plantar cutaneous reflex are down going bilaterally   Decreaed sensory over the feet    Diagnosis  Problem List Items Addressed This Visit       Cervical post-laminectomy syndrome    Relevant Medications    tapentadol (Nucynta) 75 mg tablet (Start on 8/9/2025)    tapentadol (Nucynta) 75 mg tablet (Start on 9/6/2025)    pregabalin (Lyrica) 150 mg capsule    Cervical myofascial pain syndrome    Relevant Medications    tapentadol (Nucynta) 75 mg tablet (Start on 8/9/2025)    tapentadol (Nucynta) 75 mg tablet (Start on 9/6/2025)    pregabalin (Lyrica) 150 mg capsule    Cervical radiculopathy    Relevant Medications    tapentadol (Nucynta) 75 mg tablet (Start on 8/9/2025)    tapentadol (Nucynta) 75 mg tablet (Start on 9/6/2025)    pregabalin (Lyrica) 150 mg capsule    Neurogenic pain    Relevant Medications    tapentadol (Nucynta) 75 mg tablet (Start on 8/9/2025)    tapentadol (Nucynta) 75 mg tablet (Start on 9/6/2025)    pregabalin (Lyrica) 150 mg capsule    Right rotator cuff tendinitis    Relevant Medications    tapentadol (Nucynta) 75 mg tablet (Start on 8/9/2025)    tapentadol (Nucynta) 75 mg tablet (Start on 9/6/2025)    pregabalin (Lyrica) 150 mg capsule    Long term (current) use of opiate analgesic    Relevant Medications    tapentadol (Nucynta) 75 mg tablet (Start on 8/9/2025)    tapentadol (Nucynta) 75 mg tablet (Start on 9/6/2025)    pregabalin (Lyrica) 150 mg capsule     Other Visit Diagnoses         Long term current use of opiate analgesic        Relevant Medications    tapentadol (Nucynta) 75 mg tablet (Start on 8/9/2025)    tapentadol (Nucynta) 75 mg tablet (Start on  9/6/2025)    pregabalin (Lyrica) 150 mg capsule      Encounter for long-term opiate analgesic use        Relevant Medications    tapentadol (Nucynta) 75 mg tablet (Start on 8/9/2025)    tapentadol (Nucynta) 75 mg tablet (Start on 9/6/2025)    pregabalin (Lyrica) 150 mg capsule             Plan  Reviewed the pain generators.  Went over the types of pain with neuropathic and nociceptive and different pathologies and therapeutic modalities. Discussed the mechanism of action of interventions from acupuncture, physical therapy , regular exercises, injections, botox, spinal cord stimulation, and role of surgery     Went over pathology of the intervertebral disc displacement and the anatomical relation to the Nerve roots and relation to the radicular symptoms. Went over treatment modalities with conservative treatment including acupuncture   and epidural steroid injection with fluoroscopy guidance and last resort of surgery    Based on the above findings and the clinical response to the opioids medications and improvement of the activities of daily living, sleep, and work performance. We made this complex decision to continue the opioids therapy in light of the evidence of the patient's responsibility in using the pain medications as prescribed for the nonmalignant chronic pain condition. We discussed about the use of the pain medications to treat the symptoms of chronic nonmalignant pain and we are not trying the repair the permanent damage in the tissues, rather we are trying to control the symptoms induced by the permanent damage to the tissues inducing the chronic pain condition and resulting disability. I explained the difference and discussed it with the patient and stressed the importance of knowing the difference especially because of the potential side effects and the potential addicting effect and habit forming nature of the dangerous drugs we are using to treat the symptoms of the chronic pain.      We discussed  that we are prescribing the medications on good stuart and legitimate medical reason within the scope of professional medical practice.     We reviewed the side effects and precautions of opioids prescriptions as discussed in the opioids treatment agreement.    realizes the interaction between the therapeutic classes including the respiratory depression and potential death     Random drug testing   we will submit         Discussed about NSAIDS and I explained about the opioids sparing effect to allow keeping the opioids dose at minimal effective dose.   I went over the potential side effects of the NSAIDS on the gastrointestinal, renal and cardiovascular systems.      I detailed the side effects from the acetaminophen in the medication and made aware of those. I also explained about the cumulative effects on the organs and mainly the liver.     Given the opioids therapy , we discussed about the risk for accidental over dose on the pain medications, either for patient or other household. I went over the mechanism of action and mode of use of the Naloxone according to the  recommendations. I will provide a prescription for a kit.     Focus of Today's Visit :  Check on the MRI L spine and CT of cervical fusion   Consider PN work up. He has healthy diet , no toxic exposure and no other member of family       Follow-up 8 weeks or earlier if needed     The level of clinical decision making in this office visit,  is high, given the high risks of complications with the morbidity and mortality due to the fact that acute and chronic pain may pose a threat to life and bodily function, if under treated, poorly treated, or with failure to maintain adequate treatment and timely medical follow up. Additionally over treatment has its own set of complications including overdosing on the pain medications and also the habit forming potentials with the use of the medications used to treat chronic painful conditions including  therapeutic classes classified as dangerous medications. Given the serious and fluctuating nature of pain level and instensity with extensive consideration for whenever pain changes, there is always the risk of prolonged functional impairment requiring close patient monitoring with regular assessments and reassessments and high level medical decision making at every office visit. The amount and complexity of data reviewed is high given the patient clinical presentation, labs,  data, radiology reports, and other tests as discussed during office visits. Pertinent data whether positive or negative were taken in consideration in the process of making this high level medical decision.

## 2025-08-06 PROCEDURE — RXMED WILLOW AMBULATORY MEDICATION CHARGE

## 2025-08-09 ENCOUNTER — PHARMACY VISIT (OUTPATIENT)
Dept: PHARMACY | Facility: CLINIC | Age: 50
End: 2025-08-09
Payer: COMMERCIAL

## 2025-08-09 PROCEDURE — RXMED WILLOW AMBULATORY MEDICATION CHARGE

## 2025-08-15 ENCOUNTER — APPOINTMENT (OUTPATIENT)
Dept: ORTHOPEDIC SURGERY | Facility: CLINIC | Age: 50
End: 2025-08-15
Payer: COMMERCIAL

## 2025-08-16 ENCOUNTER — APPOINTMENT (OUTPATIENT)
Dept: RADIOLOGY | Facility: HOSPITAL | Age: 50
End: 2025-08-16
Payer: COMMERCIAL

## 2025-08-19 ENCOUNTER — HOSPITAL ENCOUNTER (OUTPATIENT)
Dept: RADIOLOGY | Facility: HOSPITAL | Age: 50
Discharge: HOME | End: 2025-08-19
Payer: COMMERCIAL

## 2025-08-19 DIAGNOSIS — M54.12 CERVICAL RADICULOPATHY: ICD-10-CM

## 2025-08-19 PROCEDURE — 72125 CT NECK SPINE W/O DYE: CPT | Performed by: RADIOLOGY

## 2025-08-19 PROCEDURE — 72125 CT NECK SPINE W/O DYE: CPT

## 2025-09-03 ENCOUNTER — APPOINTMENT (OUTPATIENT)
Dept: RADIOLOGY | Facility: HOSPITAL | Age: 50
End: 2025-09-03
Payer: COMMERCIAL

## 2025-09-05 ENCOUNTER — APPOINTMENT (OUTPATIENT)
Dept: ORTHOPEDIC SURGERY | Facility: CLINIC | Age: 50
End: 2025-09-05
Payer: COMMERCIAL

## 2025-09-05 DIAGNOSIS — Z79.891 ENCOUNTER FOR LONG-TERM OPIATE ANALGESIC USE: ICD-10-CM

## 2025-09-05 DIAGNOSIS — Z79.891 LONG TERM (CURRENT) USE OF OPIATE ANALGESIC: ICD-10-CM

## 2025-09-05 DIAGNOSIS — M75.81 RIGHT ROTATOR CUFF TENDINITIS: ICD-10-CM

## 2025-09-05 DIAGNOSIS — M54.12 CERVICAL RADICULOPATHY: ICD-10-CM

## 2025-09-05 DIAGNOSIS — M79.18 CERVICAL MYOFASCIAL PAIN SYNDROME: ICD-10-CM

## 2025-09-05 DIAGNOSIS — M96.1 CERVICAL POST-LAMINECTOMY SYNDROME: ICD-10-CM

## 2025-09-05 DIAGNOSIS — Z79.891 LONG TERM CURRENT USE OF OPIATE ANALGESIC: ICD-10-CM

## 2025-09-05 DIAGNOSIS — M79.2 NEUROGENIC PAIN: ICD-10-CM

## 2025-09-06 ENCOUNTER — PHARMACY VISIT (OUTPATIENT)
Dept: PHARMACY | Facility: CLINIC | Age: 50
End: 2025-09-06
Payer: COMMERCIAL

## 2025-09-06 PROCEDURE — RXMED WILLOW AMBULATORY MEDICATION CHARGE

## 2025-09-22 ENCOUNTER — APPOINTMENT (OUTPATIENT)
Dept: RADIOLOGY | Facility: HOSPITAL | Age: 50
End: 2025-09-22
Payer: COMMERCIAL

## 2025-09-30 ENCOUNTER — APPOINTMENT (OUTPATIENT)
Dept: PAIN MEDICINE | Facility: CLINIC | Age: 50
End: 2025-09-30
Payer: COMMERCIAL

## 2025-10-03 ENCOUNTER — APPOINTMENT (OUTPATIENT)
Dept: ORTHOPEDIC SURGERY | Facility: CLINIC | Age: 50
End: 2025-10-03
Payer: COMMERCIAL